# Patient Record
Sex: FEMALE | Race: WHITE | NOT HISPANIC OR LATINO | Employment: OTHER | URBAN - METROPOLITAN AREA
[De-identification: names, ages, dates, MRNs, and addresses within clinical notes are randomized per-mention and may not be internally consistent; named-entity substitution may affect disease eponyms.]

---

## 2021-05-30 ENCOUNTER — TRANSFERRED RECORDS (OUTPATIENT)
Dept: HEALTH INFORMATION MANAGEMENT | Facility: CLINIC | Age: 81
End: 2021-05-30
Payer: COMMERCIAL

## 2022-01-10 ENCOUNTER — TRANSFERRED RECORDS (OUTPATIENT)
Dept: HEALTH INFORMATION MANAGEMENT | Facility: CLINIC | Age: 82
End: 2022-01-10
Payer: COMMERCIAL

## 2022-01-11 ENCOUNTER — MEDICAL CORRESPONDENCE (OUTPATIENT)
Dept: HEALTH INFORMATION MANAGEMENT | Facility: CLINIC | Age: 82
End: 2022-01-11
Payer: COMMERCIAL

## 2022-02-04 ENCOUNTER — TELEPHONE (OUTPATIENT)
Dept: PULMONOLOGY | Facility: CLINIC | Age: 82
End: 2022-02-04
Payer: COMMERCIAL

## 2022-02-04 NOTE — TELEPHONE ENCOUNTER
Spoke with pt regarding NEW pulm referral faxed over by [facility] for dx of lung infiltrate on CT. Pt had PFT and CT completed this month and informed her next opening was April. Pt was agreeable and appt scheduled; appt is close to 3 month protocol, so testing not scheduled. Message sent for record retrieval. Appt scheduled and informed her that itinerary and appt info will be sent to her home; mailing address verified.

## 2022-02-11 NOTE — TELEPHONE ENCOUNTER
RECORDS RECEIVED FROM: External   DATE RECEIVED: 4.13.22   NOTES STATUS DETAILS   OFFICE NOTE from referring provider N/A    OFFICE NOTE from other specialist Care Everywhere 1.11.22 [facility]   DISCHARGE SUMMARY from hospital N/A    DISCHARGE REPORT from the ER N/A    MEDICATION LIST Care Everywhere    IMAGING  (NEED IMAGES AND REPORTS)     CT SCAN In process 1.11.22 [place]  8.24.21 [place]  6.27.21 [place]  5.30.21 [place]  12.22.20 [facility]  More in [facility]   CHEST XRAY (CXR) N/A    TESTS     PULMONARY FUNCTION TESTING (PFT) Care Everywhere 1.11.22 [place]  10.25.21 [place]  12.22.20 [facility]  6.24.20 [facility]      Action 2.11.22 [facility]   Action Taken Requested images from [place] and [facility]    2.22.22 - received images/reports from [facility]

## 2022-04-12 ASSESSMENT — ENCOUNTER SYMPTOMS
SPUTUM PRODUCTION: 1
SHORTNESS OF BREATH: 1
HEMOPTYSIS: 0
COUGH DISTURBING SLEEP: 0
WHEEZING: 0
DYSPNEA ON EXERTION: 0
SNORES LOUDLY: 0
POSTURAL DYSPNEA: 0
COUGH: 1

## 2022-04-13 ENCOUNTER — PRE VISIT (OUTPATIENT)
Dept: PULMONOLOGY | Facility: CLINIC | Age: 82
End: 2022-04-13
Payer: COMMERCIAL

## 2022-04-13 ENCOUNTER — OFFICE VISIT (OUTPATIENT)
Dept: PULMONOLOGY | Facility: CLINIC | Age: 82
End: 2022-04-13
Attending: INTERNAL MEDICINE
Payer: COMMERCIAL

## 2022-04-13 VITALS
RESPIRATION RATE: 16 BRPM | WEIGHT: 130 LBS | HEART RATE: 80 BPM | BODY MASS INDEX: 19.7 KG/M2 | DIASTOLIC BLOOD PRESSURE: 77 MMHG | HEIGHT: 68 IN | SYSTOLIC BLOOD PRESSURE: 160 MMHG | OXYGEN SATURATION: 96 %

## 2022-04-13 DIAGNOSIS — J47.1 BRONCHIECTASIS WITH ACUTE EXACERBATION (H): Primary | ICD-10-CM

## 2022-04-13 PROCEDURE — 99205 OFFICE O/P NEW HI 60 MIN: CPT | Performed by: INTERNAL MEDICINE

## 2022-04-13 PROCEDURE — G0463 HOSPITAL OUTPT CLINIC VISIT: HCPCS

## 2022-04-13 PROCEDURE — 99417 PROLNG OP E/M EACH 15 MIN: CPT | Performed by: INTERNAL MEDICINE

## 2022-04-13 RX ORDER — FLECAINIDE ACETATE 50 MG/1
50 TABLET ORAL
COMMUNITY
Start: 2022-04-05

## 2022-04-13 RX ORDER — CETIRIZINE HYDROCHLORIDE 10 MG/1
10 TABLET ORAL
COMMUNITY

## 2022-04-13 RX ORDER — FLUTICASONE PROPIONATE 50 MCG
2 SPRAY, SUSPENSION (ML) NASAL
COMMUNITY
Start: 2020-06-25 | End: 2022-06-12

## 2022-04-13 RX ORDER — MONTELUKAST SODIUM 10 MG/1
1 TABLET ORAL AT BEDTIME
COMMUNITY
Start: 2020-06-25 | End: 2023-11-08

## 2022-04-13 RX ORDER — ALBUTEROL SULFATE 90 UG/1
2 AEROSOL, METERED RESPIRATORY (INHALATION) EVERY 4 HOURS PRN
Qty: 18 G | Refills: 4 | Status: SHIPPED | OUTPATIENT
Start: 2022-04-13 | End: 2023-11-08

## 2022-04-13 RX ORDER — AMLODIPINE BESYLATE 5 MG/1
5 TABLET ORAL
COMMUNITY
Start: 2020-05-26 | End: 2023-03-14

## 2022-04-13 RX ORDER — AZELASTINE 1 MG/ML
2 SPRAY, METERED NASAL
COMMUNITY
Start: 2021-12-01

## 2022-04-13 RX ORDER — ALBUTEROL SULFATE 90 UG/1
2 AEROSOL, METERED RESPIRATORY (INHALATION) EVERY 4 HOURS PRN
Qty: 18 G | Refills: 0 | Status: SHIPPED | OUTPATIENT
Start: 2022-04-13 | End: 2023-11-08

## 2022-04-13 ASSESSMENT — ENCOUNTER SYMPTOMS
HEMOPTYSIS: 0
SPUTUM PRODUCTION: 1
SHORTNESS OF BREATH: 1
COUGH: 1
WHEEZING: 0

## 2022-04-13 ASSESSMENT — PAIN SCALES - GENERAL: PAINLEVEL: NO PAIN (0)

## 2022-04-13 NOTE — LETTER
Date:April 14, 2022      Patient was self referred, no letter generated. Do not send.        [facility] Information

## 2022-04-13 NOTE — LETTER
"    4/13/2022         RE: Michelle Crowder  [address]  [place] [address]        Dear Colleague,    Thank you for referring your patient, Michelle Crowder, to the [facility] [place]. Please see a copy of my visit note below.              Pulmonary Clinic  New Patient Evaluation    Name: Michelle Crowder MRN: [id number]     Age: 81 year old   YOB: 1940             HPI:   CC: Bronchiectasis    Michelle Crowder is a 81 year old female with H/O atrial fibrillation on apixaban, osteoporosis, history of breast cancer status post mastectomy, hypertension, and bronchiectasis who presents for second opinion for management of her bronchiectasis.    She reports a history of frequent infections and \"bronchitis\" throughout her life beginning in childhood but no true pneumonia until May 30 of 2021.  The oldest CT chest I can find is from 2010 with a report of fibronodular changes in the apices and bronchiectasis.  However, other than her frequent bronchitis she had no respiratory symptoms until after her pneumonia in May.  Following that episode she feels that she has had increased coughing, dyspnea, fatigue, and malaise.  She does feel that her exercise tolerance was pretty good through the summer 2021 and notes that she moved from [facility] to [facility] in October 2021 and she completely cleaned out the house she was moving out of, packed all of her stuff, drove it to the new place, and unpacked into her new home with only mild intermittent dyspnea.  Currently she has trouble walking more than a few blocks on flat ground, or walking at a fast pace.  She thinks she could walk 2-3 flights of stairs in the normal walking pace before she would have to stop and catch her breath.    She is followed with a pulmonologist first in the [facility] system in [facility], then with the [facility] system in [place].  She has been extensively " evaluated including swallow evaluation with no evidence of aspiration.      She has had multiple bronchoscopies:  A.  11/1/2013 in the [facility] system with cytology brush and transbronchial biopsy, though I do not have the procedure report or results of this.  Per current ID note she was found to have actinomyces on BAL and was evaluated by ID but no additional details.    B.  1/27/2021 in the [place] system.  BAL with brushings.  All test negative (see data for more details), though of note I do not see bacterial culture performed on that sample.    C.  9/17/2021 in the [place] system.  BAL with 2 strains of Pseudomonas and pan Aspergillus PCR positive without specific speciation, however Aspergillus antigen PCR negative.  All other tests negative.  Per pulmonology notes the BAL fungal culture grew Exophiala Jeelifelmi, however I am not able to find these actual culture results in Care Everywhere.        Following the most recent bronchoscopy (9/17/2021) she was evaluated by infectious disease (10/4/2021).  The aspergillus was thought to be colonizer and no therapy was recommended.  Was unclear whether or not the Pseudomonas was a colonizer but a trial of eradication therapy was performed.  Due to the patient having allergies against quinolones, a PICC was placed and the patient was treated with cefepime every 8 hours x14 days (October 25 to November 1).  To the end of this therapy she felt that her dyspnea, cough, low appetite, and fatigue were much improved, however her symptoms returned approximately 1 month later.  She feels that her breathing is slowly been worsening since that time.    She was prescribed an Acapella flutter valve over a year ago, but was inconsistent in using it until earlier this year.  She reports that beginning around January she has been using her Acapella consistently twice a day, approximately 10 breaths each time.    She was given a trial of Breo which she used for a few months without  "improvement in her symptoms so this was stopped.    She uses her CPAP every night.      ALLERGIES:  Headstrong adverse reactions to both levofloxacin and ciprofloxacin consisting of dysphoria, dizziness, \"brain fog\" and shakiness.  She has hypertension when taking prednisone.    She had several questions regarding the progression of her pulmonary disease.  I educated her on the possible etiologies of bronchiectasis, including our assumption that it is due to previous infections and explained that bronchiectasis cannot be cured, but if we can keep infections under control it should not progress.      Exposure History:   Tobacco: Never, though father and  both smoked in the house. 30 yrs since last exposure.  Other inhaled substances (Vaping, hookah. Marijuana): None   Occupation:  in [employer] firm  Pets: Cats entire life  Allergies: Seasonal to \"everything\". Testing many years ago. Zyrtec, singulair, mucinex   Hobbies: Reading, puzzles, sew  Travel: No international, [place] in 2020    Home:   Moved to [place] in October 2020.  Lives in a duplex, no carpet. 55+ housing. Forced air, AC. No hot tub. Doesn't use home humidifier. Air filter in BR when smoke was bad.    Previously lived in [facility] in a house, Split level. Carpeted.  Radiant heat. No hot tub use.               Past Medical History:   atrial fibrillation on apixaban, osteoporosis, history of breast cancer status post mastectomy, hypertension, and bronchiectasis          Past Surgical History:   Mastectomy         Social History:     Social History     Socioeconomic History     Marital status:      Spouse name: Not on file     Number of children: Not on file     Years of education: Not on file     Highest education level: Not on file   Occupational History     Not on file   Tobacco Use     Smoking status: Never Smoker     Smokeless tobacco: Never Used   Substance and Sexual Activity     Alcohol use: Not Currently     Drug " use: Never     Sexual activity: Not on file   Other Topics Concern     Not on file   Social History Narrative     Not on file     Social Determinants of Health     Financial Resource Strain: Not on file   Food Insecurity: Not on file   Transportation Needs: Not on file   Physical Activity: Not on file   Stress: Not on file   Social Connections: Not on file   Intimate Partner Violence: Not on file   Housing Stability: Not on file            Family History:   History reviewed. No pertinent family history.          Immunizations:   See assessment and plan         Allergies:     Allergies   Allergen Reactions     Codeine Difficulty breathing, Palpitations, Shortness Of Breath and Other (See Comments)     Racing heart  Racing heart       Levofloxacin Difficulty breathing, Dizziness and Shortness Of Breath     Ciprofloxacin Other (See Comments)     Dizzyness     Prednisone Other (See Comments)     Pt does not wish to take again in the future. Made her feel unwell and caused her BP to go up       Seasonal Allergies Cough     Eyes water             Medications:   amLODIPine (NORVASC) 5 MG tablet, Take 5 mg by mouth  apixaban ANTICOAGULANT (ELIQUIS) 5 MG tablet, Take 5 mg by mouth  azelastine (ASTELIN) 0.1 % nasal spray, Spray 2 sprays in nostril  cetirizine (ZYRTEC) 10 MG tablet, Take 10 mg by mouth  flecainide (TAMBOCOR) 50 MG tablet, Take 50 mg by mouth  fluticasone (FLONASE) 50 MCG/ACT nasal spray, Spray 2 sprays in nostril  MAGNESIUM CITRATE PO, 500 mg  MAGNESIUM CITRATE PO, Take 135 mg by mouth  montelukast (SINGULAIR) 10 MG tablet, Take 1 tablet by mouth At Bedtime  Multiple Vitamins-Minerals (PRESERVISION AREDS 2 PO),   UNABLE TO FIND, [place]    No current facility-administered medications on file prior to visit.           Review of Systems:     Review of Systems   Respiratory: Positive for cough, sputum production and shortness of breath. Negative for hemoptysis and wheezing.    All other systems reviewed and are  "negative.             Exam:   BP (!) 160/77   Pulse 80   Resp 16   Ht 1.727 m (5' 8\")   Wt 59 kg (130 lb)   SpO2 96%   BMI 19.77 kg/m      Physical Exam  Vitals and nursing note reviewed.   Constitutional:       Appearance: Normal appearance. She is normal weight.   Eyes:      Extraocular Movements: Extraocular movements intact.      Conjunctiva/sclera: Conjunctivae normal.      Pupils: Pupils are equal, round, and reactive to light.   Cardiovascular:      Rate and Rhythm: Normal rate and regular rhythm.      Heart sounds: No murmur heard.  Pulmonary:      Effort: Pulmonary effort is normal.      Breath sounds: Normal breath sounds. No wheezing, rhonchi or rales.   Abdominal:      General: Bowel sounds are normal.      Palpations: Abdomen is soft.   Musculoskeletal:      Right lower leg: No edema.      Left lower leg: No edema.   Skin:     General: Skin is warm and dry.   Neurological:      General: No focal deficit present.      Mental Status: She is alert and oriented to person, place, and time.       Fingernails without clubbing         Data:     PFT: 1/11/2022 ([facility], not personally reviewed)  Flow-volume loops not available, but outside read:  Available data suggests moderate obstructive defect.  Absence of bronchodilator response.  Normal lung volumes.  Mild reduction in DLCO.  Increased airway resistance.         Bronchoscopy 9/17/2021  [facility].  BAL with 2 strains of Pseudomonas and pan Aspergillus PCR positive without specific speciation, however Aspergillus antigen PCR negative.  All other tests negative.  Per pulmonology notes the BAL fungal culture grew Exophiala Jeanselmi, however I am not able to find these actual culture results in Care Everywhere.      1. Diffuse bilateral bronchiectasis.   2. There were no significant secretions or endobronchial lesions.   3. There were small thick mucous plugs right lower lobe and left lower lobe.   4. BAL was done in the right middle lobe and " "Microbrush in right lower lobe. Lung wash was also sent.     Bacterial culture with 2 strains of Pseudomonas  Fungal culture with Aspergillus flavus.  Aspergillus PCR positive for \"pan Aspergillus\" but negative for A. fumigatus and A. terreus    Mucorales PCR negative  Aspergillus antigen negative  Viral respiratory panel negative  Nocardia PCR negative  MTB complex PCR negative  Legionella PCR negative  Histo/blasto PCR negative  Differential with 75% neutrophils, 9% lymphocytes, 9% macrophages  P BON negative  C pneumonia and M pneumonia negative  fungitell 101    # Cytology  A. BAL, RIGHT MIDDLE LOBE, BRONCHIAL:  - Negative for malignancy.  - Benign bronchial epithelial cells, reactive pneumocytes, and acute inflammation.         Bronchoscopy 1/27/2021  [facility].  BAL with brushings.  All test negative (see data for more details), though of note I do not see bacterial culture performed on that sample.    The trachea is of normal caliber. The yazmin is        sharp. The entire tracheobronchial tree was examined to at least the        first subsegmental level. Bronchial mucosa and anatomy are normal; there        are no endobronchial lesions and no secretions, except in the right        middle lobe a mucosal irregularity noted..        Brushings of a lesion were obtained with a cytology brush from right        middle lobe endobronchial mucsal lesion..        Bronchoalveolar lavage was performed in the RLL posterior basal segment        (B10) of the lung and sent for cell count, bacterial culture, viral        smears & culture, fungal & AFB analysis and cytology for        immunocompromised host protocol. 80 mL of fluid were instilled. 40 mL        were returned. The return was blood-tinged.   Impression:           - Infiltrate                         - Brushings were obtained.                         - Bronchoalveolar lavage was performed.      Cell count with 86% neutrophils  Mucorales PCR negative  CD4 CD8 " ratio 0.67  Histoplasma antigen negative  C pneumonia and M pneumonia negative  Viral respiratory panel negative  MTB PCR negative  Legionella PCR negative  Histo and blasto PCR negative  CMV negative  Aspergillus PCR negative    # Cytology  A. BAL, RIGHT LOWER LOBE, BRONCHIAL:  - Satisfactory for evaluation.  - Negative for malignancy.   - Benign bronchial epithelial cells, reactive pneumocytes, and acute inflammation.      B. BRONCHIAL BRUSHINGS, RIGHT MIDDLE LOBE, BRONCHIAL:  - Satisfactory for evaluation.  - Negative for malignancy.   - Benign bronchial epithelial cells.     Bronchoscopy 11/1/2013  [facility] with cytology brush and transbronchial biopsy, though I do not have the procedure report or results of this.  Per current ID note she was found to have actinomyces on BAL and was evaluated by ID but no additional details.    Autoimmune evaluation 9/1/2021  WI-3 undetectable  MPO undetectable  ANCA negative  SSA/Ro, SSB/La negative  SCL 70 B negative  EDUARD negative  CCP negative  Rheumatoid factor negative    IgA 354 (within normal limits)  IgG 1287 (within normal limits)  IgM 139  (within normal limits)    3 AFBs in July 2020 all negative          CT chest 1/10/2022  1. Some of the previously seen regions of consolidation in the lungs have resolved, however, other small regions are new compared to the prior exam. Additionally, there are new regions of tree-in-bud nodularity in the medial left upper lobe and the right middle lobe and increased tree-in-bud nodularity in the right lower lobe suggesting nonspecific infection. Similar appearance of bronchiectasis most notable in the right middle lobe that may be seen with Mycobacterium avium infection. Recommend continued follow-up with CT.   2. No suspicious pulmonary nodule, however, evaluation is limited by new regions of consolidation.       CT chest 8/23/2021  1. New areas of consolidation with surrounding groundglass opacities and micronodularity in the  right perihilar and upper lobe is concerning for an infectious/inflammatory process that has developed since 6/27/2021. Follow-up chest CT in 8-12 weeks is recommended.   2. Again seen are the changes of bronchiectasis with micronodularity and tree-in-bud opacities and patchy areas of mucous plugging and consolidation predominantly in the right middle lobe and lingula which raises the possibility of chronic mycobacterium avium infection.   3. Scattered noncalcified pulmonary nodules are stable.       CT chest 5/30/2021  1. No pulmonary emboli.   2. New areas of consolidation predominantly in the lower lungs and lingula is concerning for an infectious/inflammatory process.   3. Again seen are the changes of bronchiectasis with micronodularity and tree-in-bud opacities in both lungs predominantly in the right middle lobe and lingula suggesting a chronic inflammatory process such as mycobacterium avium infection.   4. A 5 mm noncalcified nodule in the left upper lobe has been stable since 7/2/2020.   5. Prominent right hilar lymph nodes are likely reactive.     CT chest 12/22/2020  1.  Grossly stable bilateral peripheral bronchiectasis with mucus plugging, consolidative opacities in the RIGHT lung base, and scattered nodular infiltrates representing a chronic infectious process.   2.  Additional findings as above.       CT chest 7/2/2020  1.  Constellation of findings including peripheral right middle lobe, lingular,   and posterior right lower lobe bronchiectasis with peripheral airway material   and scarring and patchy diffuse mid and lower lung zone predominant foci of   airway material with associated groundglass opacities. Overall the pattern is   most suggestive of an indolent airways inflammatory process. Nontuberculous   mycobacterial infection (ROQUE) and can produce this pattern. Chronic aspiration   is an additional consideration.      CT chest 4/10/2019 (images not available)  CHEST: There is been no  significant change in mild scattered tiny nodules and   groundglass opacities present within each lung. Mild bronchiectasis persist   within portions of right middle lobe and lingula. Stable 3 mm nodule inferior   right middle lobe. Findings may relate to old inflammatory/infectious process   such prior MAC infection but no new infiltrates or nodules.   No lymphadenopathy. Heart size normal.     1.  No etiology for symptoms identified. Benign-appearing hepatic cysts are   unchanged. No suspicious abdominal or pelvic lesion.   2.  Stable appearance of lung parenchyma with apparent sequela of remote   infection but no new process evident      CT chest 6/13/2018  1.  Apical pleural scarring unchanged.   2.  Scattered areas of bronchiectasis and chronic infiltrates and nodules within both lung fields basically unchanged.   3.  Status post LEFT mastectomy.   4.  Minimal aortic ASVD   5.  No gross mediastinal adenopathy.   6.  Hepatic cysts noted.   7.  Degenerative changes noted within the dorsal spine.       CT chest 12/15/2017  FINDINGS:  No evidence for axillary adenopathy. Previous LEFT mastectomy. Surgical clips noted in the LEFT axilla. Mediastinal lymph nodes are similar to prior study and not enlarged by CT criteria. Tonya are not adequately assessed without intravenous contrast. Aortic calcification noted. Minimal coronary calcification. Hepatic hypodensities appear similar. Adrenal glands appear within normal limits as visualized. Evaluation of the lung parenchyma demonstrates scattered nodules and densities within the lungs bilaterally. These appear similar. There is bilateral bronchiectasis.     1.  Overall no significant interval change.   2.  No axillary or mediastinal adenopathy.   3.  Aortic and coronary calcification.   4.  Bronchiectasis.   5.  Scattered nodules and densities bilaterally appear similar.       CT chest 9/15/2010 (images not available)  1.   Status post left mastectomy.   2.   Fibronodular  densities along the apical regions with some scattered   cystic changes being noted, most likely chronic inflammation and scar.   There are also a few scattered areas of what may be chronic infiltrates   and some cystic bronchiectasis and/or   scarring involving the left lung base. There are 1 or 2 small   indeterminate pulmonary nodules also. For the scattered multiple   indeterminate findings I would recommend a 3-month followup to reassess.   3.   No gross adenopathy as could be visualized.   4.   The heart is not enlarged.   5.   One large cyst involving the left hepatic lobe. There are 1 or 2   other areas which may be cysts but are really too small to characterize   involving the liver. Short-term followup would be recommended.   6.   Probable small hiatal hernia.   7.   Recommendations would be for short-term followup and reassessment in   3 months.         All studies listed here were independently reviewed and interpreted by me today unless otherwise noted for the specific study.          Assessment and Plan:     ## Bronchiectasis  ## Positive Aspergillus culture  ## Positive Pseudomonas culture status post eradication therapy  This patient has a lifelong history of frequent bronchitis and pulmonary infections but had no baseline respiratory complaints until May 30 when she developed a pneumonia, following that she had persistent dyspnea.  Etiology of her bronchiectasis is not entirely clear, but she has no recognized exposures, and a negative swallow evaluation for aspiration, so it is likely the results of recurrent pneumonia/pulmonary infections early in life. 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  She was also found to have 2 strains of Pseudomonas.  It was unclear whether not this was a colonizer, however she had significant symptomatic benefit with eradication therapy (2 weeks of cefepime) and return of symptoms approximately 1 month after completion of therapy.   This change in her symptoms would suggest the Pseudomonas is a true infection rather than merely a colonizer.  She has previously been prescribed some level of clearance therapy with an Acapella flutter valve, however for much of her past she was inconsistent in its use.  She reports consistently using it twice daily starting in January.  She has tried Brio Ellipta inhaler without symptomatic benefit.  Her return of symptoms 1 month after completing Pseudomonas eradication therapy suggest that she again has an active infection.  I will reculture her with an expectorated sputum to see if she has Pseudomonas.  If this is negative she may benefit from bronchoscopy.  If she is found to have Pseudomonas we will likely pursue a second round of eradication therapy with systemic antibiotics combined with inhaled tobramycin.  I would then continue inhaled tobramycin with 2 weeks of therapy alternating with 2 weeks off of therapy for an initial period of 6 months for maintenance of Pseudomonas control.    -Sputum culture  -If sputum culture is unrevealing, will likely need bronchoscopy with BAL  -Start albuterol, use as needed, but specifically prior to use of flutter valve  -Continue using flutter valve (has Acapella, given Aerobika today) 3-4 times per day as able  -If this is insufficient for pulmonary clearance, will consider vest therapy.      -There are plans which will allow a 30-day trial of vest therapy to assess for response.  -She will likely benefit from pulmonary rehab, will discuss at follow-up visit  -Aspergillus is likely a colonizer so we will not focus on this for now.  However, if she continues to be symptomatic after repeat Pseudomonas eradication, she may benefit from additional evaluation for Aspergillus, including Aspergillus specific IgE to assess for ABPA.      ### JEAN  She follows with sleep medicine in the [facility], uses CPAP consistently at night.  -Continue using CPAP  -Consider future  evaluation for pulmonary hypertension if dyspnea persists        ## Health maintenance  She has had 2 Pneumovax shots, the Prevnar, annual flu vaccine, 2 Covid shots, one booster, and is planning to receive her second booster.        Return to clinic: 4 months      I personally spent 205 minutes on the date of the encounter doing extensive review of this patient's outside records, history and exam, documentation and further activities per the note.      Nicolás Cunningham M.D.  Pulmonary & Critical Care  Pager: Click Here to page      The above note was dictated using voice recognition software and may include typographical errors. Please contact the author for any clarifications.        Again, thank you for allowing me to participate in the care of your patient.        Sincerely,        Nicolás Cunningham MD

## 2022-04-13 NOTE — PROGRESS NOTES
"          Pulmonary Clinic  New Patient Evaluation    Name: Michelle Crowder MRN: [id number]     Age: 81 year old   YOB: 1940             HPI:   CC: Bronchiectasis    Michelle Crowder is a 81 year old female with H/O atrial fibrillation on apixaban, osteoporosis, history of breast cancer status post mastectomy, hypertension, and bronchiectasis who presents for second opinion for management of her bronchiectasis.    She reports a history of frequent infections and \"bronchitis\" throughout her life beginning in childhood but no true pneumonia until May 30 of 2021.  The oldest CT chest I can find is from 2010 with a report of fibronodular changes in the apices and bronchiectasis.  However, other than her frequent bronchitis she had no respiratory symptoms until after her pneumonia in May.  Following that episode she feels that she has had increased coughing, dyspnea, fatigue, and malaise.  She does feel that her exercise tolerance was pretty good through the summer 2021 and notes that she moved from [facility] to [facility] in October 2021 and she completely cleaned out the house she was moving out of, packed all of her stuff, drove it to the new place, and unpacked into her new home with only mild intermittent dyspnea.  Currently she has trouble walking more than a few blocks on flat ground, or walking at a fast pace.  She thinks she could walk 2-3 flights of stairs in the normal walking pace before she would have to stop and catch her breath.    She is followed with a pulmonologist first in the [facility] in [facility], then with the [facility] in [place].  She has been extensively evaluated including swallow evaluation with no evidence of aspiration.      She has had multiple bronchoscopies:  A.  11/1/2013 in the [facility] with cytology brush and transbronchial biopsy, though I do not have the procedure report or results of this.  Per current ID note she was found " "to have actinomyces on BAL and was evaluated by ID but no additional details.    B.  1/27/2021 in the [facility] system.  BAL with brushings.  All test negative (see data for more details), though of note I do not see bacterial culture performed on that sample.    C.  9/17/2021 in the [facility] system.  BAL with 2 strains of Pseudomonas and pan Aspergillus PCR positive without specific speciation, however Aspergillus antigen PCR negative.  All other tests negative.  Per pulmonology notes the BAL fungal culture grew Exophiala Jeanselmi, however I am not able to find these actual culture results in Care Everywhere.        Following the most recent bronchoscopy (9/17/2021) she was evaluated by infectious disease (10/4/2021).  The aspergillus was thought to be colonizer and no therapy was recommended.  Was unclear whether or not the Pseudomonas was a colonizer but a trial of eradication therapy was performed.  Due to the patient having allergies against quinolones, a PICC was placed and the patient was treated with cefepime every 8 hours x14 days (October 25 to November 1).  To the end of this therapy she felt that her dyspnea, cough, low appetite, and fatigue were much improved, however her symptoms returned approximately 1 month later.  She feels that her breathing is slowly been worsening since that time.    She was prescribed an Acapella flutter valve over a year ago, but was inconsistent in using it until earlier this year.  She reports that beginning around January she has been using her Acapella consistently twice a day, approximately 10 breaths each time.    She was given a trial of Breo which she used for a few months without improvement in her symptoms so this was stopped.    She uses her CPAP every night.      ALLERGIES:  Headstrong adverse reactions to both levofloxacin and ciprofloxacin consisting of dysphoria, dizziness, \"brain fog\" and shakiness.  She has hypertension when taking prednisone.    She had several " "questions regarding the progression of her pulmonary disease.  I educated her on the possible etiologies of bronchiectasis, including our assumption that it is due to previous infections and explained that bronchiectasis cannot be cured, but if we can keep infections under control it should not progress.      Exposure History:   Tobacco: Never, though father and  both smoked in the house. 30 yrs since last exposure.  Other inhaled substances (Vaping, hookah. Marijuana): None   Occupation:  in accounting firm  Pets: Cats entire life  Allergies: Seasonal to \"everything\". Testing many years ago. Zyrtec, singulair, mucinex   Hobbies: Reading, puzzles, sew  Travel: No international, [place] in 2020    Home:   Moved to [place] in October 2020.  Lives in a duplex, no carpet. 55+ housing. Forced air, AC. No hot tub. Doesn't use home humidifier. Air filter in BR when smoke was bad.    Previously lived in [facility] in a house, Split level. Carpeted.  Radiant heat. No hot tub use.               Past Medical History:   atrial fibrillation on apixaban, osteoporosis, history of breast cancer status post mastectomy, hypertension, and bronchiectasis          Past Surgical History:   Mastectomy         Social History:     Social History     Socioeconomic History     Marital status:      Spouse name: Not on file     Number of children: Not on file     Years of education: Not on file     Highest education level: Not on file   Occupational History     Not on file   Tobacco Use     Smoking status: Never Smoker     Smokeless tobacco: Never Used   Substance and Sexual Activity     Alcohol use: Not Currently     Drug use: Never     Sexual activity: Not on file   Other Topics Concern     Not on file   Social History Narrative     Not on file     Social Determinants of Health     Financial Resource Strain: Not on file   Food Insecurity: Not on file   Transportation Needs: Not on file   Physical Activity: Not " "on file   Stress: Not on file   Social Connections: Not on file   Intimate Partner Violence: Not on file   Housing Stability: Not on file            Family History:   History reviewed. No pertinent family history.          Immunizations:   See assessment and plan         Allergies:     Allergies   Allergen Reactions     Codeine Difficulty breathing, Palpitations, Shortness Of Breath and Other (See Comments)     Racing heart  Racing heart       Levofloxacin Difficulty breathing, Dizziness and Shortness Of Breath     Ciprofloxacin Other (See Comments)     Dizzyness     Prednisone Other (See Comments)     Pt does not wish to take again in the future. Made her feel unwell and caused her BP to go up       Seasonal Allergies Cough     Eyes water             Medications:   amLODIPine (NORVASC) 5 MG tablet, Take 5 mg by mouth  apixaban ANTICOAGULANT (ELIQUIS) 5 MG tablet, Take 5 mg by mouth  azelastine (ASTELIN) 0.1 % nasal spray, Spray 2 sprays in nostril  cetirizine (ZYRTEC) 10 MG tablet, Take 10 mg by mouth  flecainide (TAMBOCOR) 50 MG tablet, Take 50 mg by mouth  fluticasone (FLONASE) 50 MCG/ACT nasal spray, Spray 2 sprays in nostril  MAGNESIUM CITRATE PO, 500 mg  MAGNESIUM CITRATE PO, Take 135 mg by mouth  montelukast (SINGULAIR) 10 MG tablet, Take 1 tablet by mouth At Bedtime  Multiple Vitamins-Minerals (PRESERVISION AREDS 2 PO),   UNABLE TO FIND, [place]    No current facility-administered medications on file prior to visit.           Review of Systems:     Review of Systems   Respiratory: Positive for cough, sputum production and shortness of breath. Negative for hemoptysis and wheezing.    All other systems reviewed and are negative.             Exam:   BP (!) 160/77   Pulse 80   Resp 16   Ht 1.727 m (5' 8\")   Wt 59 kg (130 lb)   SpO2 96%   BMI 19.77 kg/m      Physical Exam  Vitals and nursing note reviewed.   Constitutional:       Appearance: Normal appearance. She is normal weight.   Eyes:      Extraocular " "Movements: Extraocular movements intact.      Conjunctiva/sclera: Conjunctivae normal.      Pupils: Pupils are equal, round, and reactive to light.   Cardiovascular:      Rate and Rhythm: Normal rate and regular rhythm.      Heart sounds: No murmur heard.  Pulmonary:      Effort: Pulmonary effort is normal.      Breath sounds: Normal breath sounds. No wheezing, rhonchi or rales.   Abdominal:      General: Bowel sounds are normal.      Palpations: Abdomen is soft.   Musculoskeletal:      Right lower leg: No edema.      Left lower leg: No edema.   Skin:     General: Skin is warm and dry.   Neurological:      General: No focal deficit present.      Mental Status: She is alert and oriented to person, place, and time.       Fingernails without clubbing         Data:     PFT: 1/11/2022 ([facility], not personally reviewed)  Flow-volume loops not available, but outside read:  Available data suggests moderate obstructive defect.  Absence of bronchodilator response.  Normal lung volumes.  Mild reduction in DLCO.  Increased airway resistance.         Bronchoscopy 9/17/2021  [facility].  BAL with 2 strains of Pseudomonas and pan Aspergillus PCR positive without specific speciation, however Aspergillus antigen PCR negative.  All other tests negative.  Per pulmonology notes the BAL fungal culture grew Exophiala Jeanselmi, however I am not able to find these actual culture results in Care Everywhere.      1. Diffuse bilateral bronchiectasis.   2. There were no significant secretions or endobronchial lesions.   3. There were small thick mucous plugs right lower lobe and left lower lobe.   4. BAL was done in the right middle lobe and Microbrush in right lower lobe. Lung wash was also sent.     Bacterial culture with 2 strains of Pseudomonas  Fungal culture with Aspergillus flavus.  Aspergillus PCR positive for \"pan Aspergillus\" but negative for A. fumigatus and A. terreus    Mucorales PCR negative  Aspergillus antigen " negative  Viral respiratory panel negative  Nocardia PCR negative  MTB complex PCR negative  Legionella PCR negative  Histo/blasto PCR negative  Differential with 75% neutrophils, 9% lymphocytes, 9% macrophages  P BON negative  C pneumonia and M pneumonia negative  fungitell 101    # Cytology  A. BAL, RIGHT MIDDLE LOBE, BRONCHIAL:  - Negative for malignancy.  - Benign bronchial epithelial cells, reactive pneumocytes, and acute inflammation.         Bronchoscopy 1/27/2021  [facility].  BAL with brushings.  All test negative (see data for more details), though of note I do not see bacterial culture performed on that sample.    The trachea is of normal caliber. The yazmin is        sharp. The entire tracheobronchial tree was examined to at least the        first subsegmental level. Bronchial mucosa and anatomy are normal; there        are no endobronchial lesions and no secretions, except in the right        middle lobe a mucosal irregularity noted..        Brushings of a lesion were obtained with a cytology brush from right        middle lobe endobronchial mucsal lesion..        Bronchoalveolar lavage was performed in the RLL posterior basal segment        (B10) of the lung and sent for cell count, bacterial culture, viral        smears & culture, fungal & AFB analysis and cytology for        immunocompromised host protocol. 80 mL of fluid were instilled. 40 mL        were returned. The return was blood-tinged.   Impression:           - Infiltrate                         - Brushings were obtained.                         - Bronchoalveolar lavage was performed.      Cell count with 86% neutrophils  Mucorales PCR negative  CD4 CD8 ratio 0.67  Histoplasma antigen negative  C pneumonia and M pneumonia negative  Viral respiratory panel negative  MTB PCR negative  Legionella PCR negative  Histo and blasto PCR negative  CMV negative  Aspergillus PCR negative    # Cytology  A. BAL, RIGHT LOWER LOBE, BRONCHIAL:  -  Satisfactory for evaluation.  - Negative for malignancy.   - Benign bronchial epithelial cells, reactive pneumocytes, and acute inflammation.      B. BRONCHIAL BRUSHINGS, RIGHT MIDDLE LOBE, BRONCHIAL:  - Satisfactory for evaluation.  - Negative for malignancy.   - Benign bronchial epithelial cells.     Bronchoscopy 11/1/2013  [facility] with cytology brush and transbronchial biopsy, though I do not have the procedure report or results of this.  Per current ID note she was found to have actinomyces on BAL and was evaluated by ID but no additional details.    Autoimmune evaluation 9/1/2021  WI-3 undetectable  MPO undetectable  ANCA negative  SSA/Ro, SSB/La negative  SCL 70 B negative  EDUARD negative  CCP negative  Rheumatoid factor negative    IgA 354 (within normal limits)  IgG 1287 (within normal limits)  IgM 139  (within normal limits)    3 AFBs in July 2020 all negative          CT chest 1/10/2022  1. Some of the previously seen regions of consolidation in the lungs have resolved, however, other small regions are new compared to the prior exam. Additionally, there are new regions of tree-in-bud nodularity in the medial left upper lobe and the right middle lobe and increased tree-in-bud nodularity in the right lower lobe suggesting nonspecific infection. Similar appearance of bronchiectasis most notable in the right middle lobe that may be seen with Mycobacterium avium infection. Recommend continued follow-up with CT.   2. No suspicious pulmonary nodule, however, evaluation is limited by new regions of consolidation.       CT chest 8/23/2021  1. New areas of consolidation with surrounding groundglass opacities and micronodularity in the right perihilar and upper lobe is concerning for an infectious/inflammatory process that has developed since 6/27/2021. Follow-up chest CT in 8-12 weeks is recommended.   2. Again seen are the changes of bronchiectasis with micronodularity and tree-in-bud opacities and patchy areas  of mucous plugging and consolidation predominantly in the right middle lobe and lingula which raises the possibility of chronic mycobacterium avium infection.   3. Scattered noncalcified pulmonary nodules are stable.       CT chest 5/30/2021  1. No pulmonary emboli.   2. New areas of consolidation predominantly in the lower lungs and lingula is concerning for an infectious/inflammatory process.   3. Again seen are the changes of bronchiectasis with micronodularity and tree-in-bud opacities in both lungs predominantly in the right middle lobe and lingula suggesting a chronic inflammatory process such as mycobacterium avium infection.   4. A 5 mm noncalcified nodule in the left upper lobe has been stable since 7/2/2020.   5. Prominent right hilar lymph nodes are likely reactive.     CT chest 12/22/2020  1.  Grossly stable bilateral peripheral bronchiectasis with mucus plugging, consolidative opacities in the RIGHT lung base, and scattered nodular infiltrates representing a chronic infectious process.   2.  Additional findings as above.       CT chest 7/2/2020  1.  Constellation of findings including peripheral right middle lobe, lingular,   and posterior right lower lobe bronchiectasis with peripheral airway material   and scarring and patchy diffuse mid and lower lung zone predominant foci of   airway material with associated groundglass opacities. Overall the pattern is   most suggestive of an indolent airways inflammatory process. Nontuberculous   mycobacterial infection (ROQUE) and can produce this pattern. Chronic aspiration   is an additional consideration.      CT chest 4/10/2019 (images not available)  CHEST: There is been no significant change in mild scattered tiny nodules and   groundglass opacities present within each lung. Mild bronchiectasis persist   within portions of right middle lobe and lingula. Stable 3 mm nodule inferior   right middle lobe. Findings may relate to old inflammatory/infectious process    such prior MAC infection but no new infiltrates or nodules.   No lymphadenopathy. Heart size normal.     1.  No etiology for symptoms identified. Benign-appearing hepatic cysts are   unchanged. No suspicious abdominal or pelvic lesion.   2.  Stable appearance of lung parenchyma with apparent sequela of remote   infection but no new process evident      CT chest 6/13/2018  1.  Apical pleural scarring unchanged.   2.  Scattered areas of bronchiectasis and chronic infiltrates and nodules within both lung fields basically unchanged.   3.  Status post LEFT mastectomy.   4.  Minimal aortic ASVD   5.  No gross mediastinal adenopathy.   6.  Hepatic cysts noted.   7.  Degenerative changes noted within the dorsal spine.       CT chest 12/15/2017  FINDINGS:  No evidence for axillary adenopathy. Previous LEFT mastectomy. Surgical clips noted in the LEFT axilla. Mediastinal lymph nodes are similar to prior study and not enlarged by CT criteria. Tonya are not adequately assessed without intravenous contrast. Aortic calcification noted. Minimal coronary calcification. Hepatic hypodensities appear similar. Adrenal glands appear within normal limits as visualized. Evaluation of the lung parenchyma demonstrates scattered nodules and densities within the lungs bilaterally. These appear similar. There is bilateral bronchiectasis.     1.  Overall no significant interval change.   2.  No axillary or mediastinal adenopathy.   3.  Aortic and coronary calcification.   4.  Bronchiectasis.   5.  Scattered nodules and densities bilaterally appear similar.       CT chest 9/15/2010 (images not available)  1.   Status post left mastectomy.   2.   Fibronodular densities along the apical regions with some scattered   cystic changes being noted, most likely chronic inflammation and scar.   There are also a few scattered areas of what may be chronic infiltrates   and some cystic bronchiectasis and/or   scarring involving the left lung base. There  are 1 or 2 small   indeterminate pulmonary nodules also. For the scattered multiple   indeterminate findings I would recommend a 3-month followup to reassess.   3.   No gross adenopathy as could be visualized.   4.   The heart is not enlarged.   5.   One large cyst involving the left hepatic lobe. There are 1 or 2   other areas which may be cysts but are really too small to characterize   involving the liver. Short-term followup would be recommended.   6.   Probable small hiatal hernia.   7.   Recommendations would be for short-term followup and reassessment in   3 months.         All studies listed here were independently reviewed and interpreted by me today unless otherwise noted for the specific study.          Assessment and Plan:     ## Bronchiectasis  ## Positive Aspergillus culture  ## Positive Pseudomonas culture status post eradication therapy  This patient has a lifelong history of frequent bronchitis and pulmonary infections but had no baseline respiratory complaints until May 30 when she developed a pneumonia, following that she had persistent dyspnea.  Etiology of her bronchiectasis is not entirely clear, but she has no recognized exposures, and a negative swallow evaluation for aspiration, so it is likely the results of recurrent pneumonia/pulmonary infections early in life. 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  She was also found to have 2 strains of Pseudomonas.  It was unclear whether not this was a colonizer, however she had significant symptomatic benefit with eradication therapy (2 weeks of cefepime) and return of symptoms approximately 1 month after completion of therapy.  This change in her symptoms would suggest the Pseudomonas is a true infection rather than merely a colonizer.  She has previously been prescribed some level of clearance therapy with an Acapella flutter valve, however for much of her past she was inconsistent in its use.  She reports  consistently using it twice daily starting in January.  She has tried Brio Ellipta inhaler without symptomatic benefit.  Her return of symptoms 1 month after completing Pseudomonas eradication therapy suggest that she again has an active infection.  I will reculture her with an expectorated sputum to see if she has Pseudomonas.  If this is negative she may benefit from bronchoscopy.  If she is found to have Pseudomonas we will likely pursue a second round of eradication therapy with systemic antibiotics combined with inhaled tobramycin.  I would then continue inhaled tobramycin with 2 weeks of therapy alternating with 2 weeks off of therapy for an initial period of 6 months for maintenance of Pseudomonas control.    -Sputum culture  -If sputum culture is unrevealing, will likely need bronchoscopy with BAL  -Start albuterol, use as needed, but specifically prior to use of flutter valve  -Continue using flutter valve (has Acapella, given Aerobika today) 3-4 times per day as able  -If this is insufficient for pulmonary clearance, will consider vest therapy.      -There are plans which will allow a 30-day trial of vest therapy to assess for response.  -She will likely benefit from pulmonary rehab, will discuss at follow-up visit  -Aspergillus is likely a colonizer so we will not focus on this for now.  However, if she continues to be symptomatic after repeat Pseudomonas eradication, she may benefit from additional evaluation for Aspergillus, including Aspergillus specific IgE to assess for ABPA.      ### JEAN  She follows with sleep medicine in the [facility], uses CPAP consistently at night.  -Continue using CPAP  -Consider future evaluation for pulmonary hypertension if dyspnea persists        ## Health maintenance  She has had 2 Pneumovax shots, the Prevnar, annual flu vaccine, 2 Covid shots, one booster, and is planning to receive her second booster.        Return to clinic: 4 months      I personally spent 205  minutes on the date of the encounter doing extensive review of this patient's outside records, history and exam, documentation and further activities per the note.      Nicolás Cunningham M.D.  Pulmonary & Critical Care  Pager: Click Here to page      The above note was dictated using voice recognition software and may include typographical errors. Please contact the author for any clarifications.

## 2022-04-13 NOTE — NURSING NOTE
Chief Complaint   Patient presents with     Consult     New CT infiltrate    Medications reviewed and vital signs taken.   Tonio Luis CMA

## 2022-04-13 NOTE — PATIENT INSTRUCTIONS
Start using albuterol about 10-15 minutes before using the flutter valve.  Use flutter valve as many times per day as you can, try to target 3-4.

## 2022-04-15 ENCOUNTER — LAB (OUTPATIENT)
Dept: LAB | Facility: CLINIC | Age: 82
End: 2022-04-15
Payer: COMMERCIAL

## 2022-04-15 DIAGNOSIS — J47.1 BRONCHIECTASIS WITH ACUTE EXACERBATION (H): ICD-10-CM

## 2022-04-15 LAB
BACTERIA SPT CULT: NORMAL
GRAM STAIN RESULT: NORMAL

## 2022-04-15 PROCEDURE — 87205 SMEAR GRAM STAIN: CPT

## 2022-05-26 ENCOUNTER — TELEPHONE (OUTPATIENT)
Dept: PULMONOLOGY | Facility: CLINIC | Age: 82
End: 2022-05-26
Payer: COMMERCIAL

## 2022-05-26 DIAGNOSIS — J47.1 BRONCHIECTASIS WITH ACUTE EXACERBATION (H): Primary | ICD-10-CM

## 2022-05-26 NOTE — TELEPHONE ENCOUNTER
Spoke with pt regarding scheduling CT chest and PFT as instructed by Dr. Cunningham and Aysha RN, prior to visit in Aug. Per chart review, appears appt in Aug (8/17) will also need to be rescheduled as provider is unavailable. Appts rescheduled and details confirmed with pt who requested itinerary be mailed to her home; mailing address verified.

## 2022-05-29 ENCOUNTER — HEALTH MAINTENANCE LETTER (OUTPATIENT)
Age: 82
End: 2022-05-29

## 2022-08-02 ENCOUNTER — LAB (OUTPATIENT)
Dept: LAB | Facility: CLINIC | Age: 82
End: 2022-08-02
Payer: COMMERCIAL

## 2022-08-02 ENCOUNTER — ANCILLARY PROCEDURE (OUTPATIENT)
Dept: CT IMAGING | Facility: CLINIC | Age: 82
End: 2022-08-02
Attending: INTERNAL MEDICINE
Payer: COMMERCIAL

## 2022-08-02 ENCOUNTER — OFFICE VISIT (OUTPATIENT)
Dept: PULMONOLOGY | Facility: CLINIC | Age: 82
End: 2022-08-02
Attending: INTERNAL MEDICINE
Payer: COMMERCIAL

## 2022-08-02 VITALS
SYSTOLIC BLOOD PRESSURE: 146 MMHG | HEIGHT: 68 IN | WEIGHT: 128 LBS | BODY MASS INDEX: 19.4 KG/M2 | OXYGEN SATURATION: 97 % | DIASTOLIC BLOOD PRESSURE: 73 MMHG | HEART RATE: 78 BPM

## 2022-08-02 DIAGNOSIS — J47.9 BRONCHIECTASIS WITHOUT COMPLICATION (H): ICD-10-CM

## 2022-08-02 DIAGNOSIS — J47.1 BRONCHIECTASIS WITH ACUTE EXACERBATION (H): ICD-10-CM

## 2022-08-02 DIAGNOSIS — J47.1 BRONCHIECTASIS WITH ACUTE EXACERBATION (H): Primary | ICD-10-CM

## 2022-08-02 DIAGNOSIS — J47.9 BRONCHIECTASIS (H): Primary | ICD-10-CM

## 2022-08-02 PROCEDURE — G0463 HOSPITAL OUTPT CLINIC VISIT: HCPCS | Mod: 25

## 2022-08-02 PROCEDURE — 99000 SPECIMEN HANDLING OFFICE-LAB: CPT | Performed by: PATHOLOGY

## 2022-08-02 PROCEDURE — 94729 DIFFUSING CAPACITY: CPT | Performed by: INTERNAL MEDICINE

## 2022-08-02 PROCEDURE — 71250 CT THORAX DX C-: CPT | Mod: GC | Performed by: RADIOLOGY

## 2022-08-02 PROCEDURE — 99215 OFFICE O/P EST HI 40 MIN: CPT | Mod: 25 | Performed by: INTERNAL MEDICINE

## 2022-08-02 PROCEDURE — 82785 ASSAY OF IGE: CPT | Performed by: INTERNAL MEDICINE

## 2022-08-02 PROCEDURE — 86003 ALLG SPEC IGE CRUDE XTRC EA: CPT | Mod: GA | Performed by: INTERNAL MEDICINE

## 2022-08-02 PROCEDURE — 36415 COLL VENOUS BLD VENIPUNCTURE: CPT | Performed by: PATHOLOGY

## 2022-08-02 PROCEDURE — 94060 EVALUATION OF WHEEZING: CPT | Performed by: INTERNAL MEDICINE

## 2022-08-02 PROCEDURE — 86317 IMMUNOASSAY INFECTIOUS AGENT: CPT | Mod: 90 | Performed by: PATHOLOGY

## 2022-08-02 PROCEDURE — 94726 PLETHYSMOGRAPHY LUNG VOLUMES: CPT | Performed by: INTERNAL MEDICINE

## 2022-08-02 RX ORDER — SODIUM CHLORIDE FOR INHALATION 7 %
VIAL, NEBULIZER (ML) INHALATION
Qty: 4 ML | Refills: 0 | Status: SHIPPED | OUTPATIENT
Start: 2022-08-02 | End: 2023-11-08

## 2022-08-02 ASSESSMENT — PAIN SCALES - GENERAL: PAINLEVEL: NO PAIN (0)

## 2022-08-02 NOTE — PROGRESS NOTES
Pulmonology Clinic Follow up Visit       Michelle Crowder MRN# [id number]   YOB: 1940 Age: 81 year old     Date of Visit: 8/2/2022    Reason for Visit: Follow-up bronchiectasis          Assessment and Plan:       ## Bronchiectasis  ## Past Aspergillus culture  ## Positive Pseudomonas culture status post eradication therapy  This patient has a lifelong history of frequent bronchitis and pulmonary infections but had no baseline respiratory complaints until May 30 2021 when she developed a pneumonia, following that she had persistent dyspnea.  Etiology of her bronchiectasis is not entirely clear, but she has no recognized exposures, and a negative swallow evaluation for aspiration, has undergone bronchoscopy on 9/17/2021 at [facility] with Aspergillus and Pseudomonas.  Multiple AFBs have been negative.  So it is likely the results of recurrent pneumonia/pulmonary infections early in life. 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  She was also found to have 2 strains of Pseudomonas.  It was unclear whether not this was a colonizer, however she had significant symptomatic benefit with eradication therapy (2 weeks of cefepime) and return of symptoms approximately 1 month after completion of therapy.  This change in her symptoms would suggest the Pseudomonas is a true infection rather than merely a colonizer.  She has previously been prescribed some level of clearance therapy with an Acapella flutter valve, however for much of her past she was inconsistent in its use.  She reports consistently using it twice daily starting in January 2022.  She has tried Brio Ellipta inhaler without symptomatic benefit.  Her return of symptoms 1 month after completing Pseudomonas eradication therapy suggest that she again has an active infection.  We tried to obtain a sputum culture following her last visit but this was contaminated with oral material.  At this point I will pursue an  induced sputum. If this is negative she may benefit from bronchoscopy.  If she is found to have Pseudomonas we will likely pursue a second round of eradication therapy with systemic antibiotics combined with inhaled tobramycin.  I would then continue inhaled tobramycin with 2 weeks of therapy alternating with 2 weeks off of therapy for an initial period of 6 months for maintenance of Pseudomonas control.     -Induced sputum  -If sputum culture is unrevealing, will likely need bronchoscopy with BAL  -Start albuterol, use as needed, but specifically prior to use of flutter valve  -Continue using flutter valve (has Acapella, given Aerobika today) 3-4 times per day as able  -We will arrange a trial of percussion vest  -Pulmonary rehab referral  -Aspergillus is likely a colonizer so we will not focus on this for now.  However, if she continues to be symptomatic after repeat Pseudomonas eradication, she may benefit from additional evaluation for Aspergillus, including Aspergillus specific IgE to assess for ABPA.  -Total and Aspergillus specific IgE     ### JEAN  She follows with sleep medicine in the [facility], uses CPAP consistently at night.  -Continue using CPAP  -Consider future evaluation for pulmonary hypertension if dyspnea persists           ## Health maintenance  She has had 2 Pneumovax shots, the Prevnar, annual flu vaccine, for COVID shots.      Return to clinic: 6 months    I personally spent 49 minutes on the date of the encounter doing chart review, history and exam, documentation and further activities per the note.      Nicolás Cunningham M.D.  Pulmonary & Critical Care  Pager: Click Here to page          History of Present Illness / Interval History:     Michelle Crowder is a 81 year old female with H/O atrial fibrillation on apixaban, osteoporosis, history of breast cancer status post mastectomy, hypertension, and bronchiectasis who presents for second opinion for management of her  "bronchiectasis.      Last seen when she established care on 4/13/2022.    Breathing about the same but she feels her cough will go away.  Her cough is occasionally productive but not persistently.  She also experiences intermittent wheezing.  She uses her albuterol inhaler and Aerobika twice daily and occasionally gets good mucus return with this.  She does not complain of dyspnea and is able to complete all of her desired activities without limitation.    When asked she says the cough is her most difficult symptom.            Review of Systems:     Review of Systems   Constitutional: Negative for chills, fever and weight loss.   HENT: Negative for congestion.    Respiratory: Positive for cough, sputum production and wheezing. Negative for hemoptysis and shortness of breath.    Gastrointestinal: Negative for abdominal pain, heartburn, nausea and vomiting.   Musculoskeletal: Negative for myalgias.              Physical Examination:     BP (!) 146/73 (BP Location: Right arm, Patient Position: Chair, Cuff Size: Adult Small)   Pulse 78   Ht 1.727 m (5' 8\")   Wt 58.1 kg (128 lb)   SpO2 97%   BMI 19.46 kg/m        Physical Exam  Vitals and nursing note reviewed.   Cardiovascular:      Rate and Rhythm: Normal rate and regular rhythm.      Heart sounds: No murmur heard.  Pulmonary:      Effort: Pulmonary effort is normal.      Breath sounds: No wheezing, rhonchi or rales.   Skin:     General: Skin is warm and dry.   Neurological:      General: No focal deficit present.      Mental Status: She is alert and oriented to person, place, and time.       Fingernails without clubbing        Data:     PFT: 8/2/2022    The FVC is within normal limits, the FEV1 is reduced.  Following administration of bronchodilators there is no significant change in the FVC or FEV1, though there is a moderate change in the FEF 25-75.  The SVC is reduced, the TLC is within normal limits.  The RV/TLC ratio is elevated.  The diffusion capacity is " within normal limits.    IMPRESSION:  Airflow obstruction with borderline bronchodilator response and air trapping with normal diffusion capacity.      CT 8/2/2022  1. Constellation of pulmonary findings in the mid to lower lung zones  including waxing and waning peribronchovascular consolidation,  bronchiectasis, mucoid impaction, and tree-in-bud nodularity. Findings  are suggestive of infectious process such as mycobacterium avium  complex or chronic fungal infection.  2. Scattered small pulmonary nodules, including a new 6 mm nodule in  the right lower lobe. Consider follow-up CT chest in 6-12 months.        All studies listed here were independently reviewed and interpreted by me today.             Medications:     Current Outpatient Medications   Medication     albuterol (PROAIR HFA/PROVENTIL HFA/VENTOLIN HFA) 108 (90 Base) MCG/ACT inhaler     albuterol (PROAIR HFA/PROVENTIL HFA/VENTOLIN HFA) 108 (90 Base) MCG/ACT inhaler     amLODIPine (NORVASC) 5 MG tablet     apixaban ANTICOAGULANT (ELIQUIS) 5 MG tablet     azelastine (ASTELIN) 0.1 % nasal spray     cetirizine (ZYRTEC) 10 MG tablet     flecainide (TAMBOCOR) 50 MG tablet     MAGNESIUM CITRATE PO     MAGNESIUM CITRATE PO     montelukast (SINGULAIR) 10 MG tablet     Multiple Vitamins-Minerals (PRESERVISION AREDS 2 PO)     UNABLE TO FIND     No current facility-administered medications for this visit.             The above note was dictated using voice recognition software and may include typographical errors. Please contact the author for any clarifications.

## 2022-08-02 NOTE — LETTER
8/2/2022         RE: Michelle Crowder  [address] [facility] [address]        Dear Colleague,    Thank you for referring your patient, Michelle Crowder, to the [facility] FOR LUNG SCIENCE AND [facility] [place]. Please see a copy of my visit note below.    Pulmonology Clinic Follow up Visit       Michelle Crowder MRN# [id number]   YOB: 1940 Age: 81 year old     Date of Visit: 8/2/2022    Reason for Visit: Follow-up bronchiectasis          Assessment and Plan:       ## Bronchiectasis  ## Past Aspergillus culture  ## Positive Pseudomonas culture status post eradication therapy  This patient has a lifelong history of frequent bronchitis and pulmonary infections but had no baseline respiratory complaints until May 30 2021 when she developed a pneumonia, following that she had persistent dyspnea.  Etiology of her bronchiectasis is not entirely clear, but she has no recognized exposures, and a negative swallow evaluation for aspiration, has undergone bronchoscopy on 9/17/2021 at [facility] with Aspergillus and Pseudomonas.  Multiple AFBs have been negative.  So it is likely the results of recurrent pneumonia/pulmonary infections early in life. 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  She was also found to have 2 strains of Pseudomonas.  It was unclear whether not this was a colonizer, however she had significant symptomatic benefit with eradication therapy (2 weeks of cefepime) and return of symptoms approximately 1 month after completion of therapy.  This change in her symptoms would suggest the Pseudomonas is a true infection rather than merely a colonizer.  She has previously been prescribed some level of clearance therapy with an Acapella flutter valve, however for much of her past she was inconsistent in its use.  She reports consistently using it twice daily starting in January 2022.  She has tried Brio Ellipta inhaler without  symptomatic benefit.  Her return of symptoms 1 month after completing Pseudomonas eradication therapy suggest that she again has an active infection.  We tried to obtain a sputum culture following her last visit but this was contaminated with oral material.  At this point I will pursue an induced sputum. If this is negative she may benefit from bronchoscopy.  If she is found to have Pseudomonas we will likely pursue a second round of eradication therapy with systemic antibiotics combined with inhaled tobramycin.  I would then continue inhaled tobramycin with 2 weeks of therapy alternating with 2 weeks off of therapy for an initial period of 6 months for maintenance of Pseudomonas control.     -Induced sputum  -If sputum culture is unrevealing, will likely need bronchoscopy with BAL  -Start albuterol, use as needed, but specifically prior to use of flutter valve  -Continue using flutter valve (has Acapella, given Aerobika today) 3-4 times per day as able  -We will arrange a trial of percussion vest  -Pulmonary rehab referral  -Aspergillus is likely a colonizer so we will not focus on this for now.  However, if she continues to be symptomatic after repeat Pseudomonas eradication, she may benefit from additional evaluation for Aspergillus, including Aspergillus specific IgE to assess for ABPA.  -Total and Aspergillus specific IgE     ### JEAN  She follows with sleep medicine in the [facility], uses CPAP consistently at night.  -Continue using CPAP  -Consider future evaluation for pulmonary hypertension if dyspnea persists           ## Health maintenance  She has had 2 Pneumovax shots, the Prevnar, annual flu vaccine, for COVID shots.      Return to clinic: 6 months    I personally spent 49 minutes on the date of the encounter doing chart review, history and exam, documentation and further activities per the note.      Nicolás Cunningham M.D.  Pulmonary & Critical Care  Pager: Click Here to page          History of  "Present Illness / Interval History:     Michelle Crowder is a 81 year old female with H/O atrial fibrillation on apixaban, osteoporosis, history of breast cancer status post mastectomy, hypertension, and bronchiectasis who presents for second opinion for management of her bronchiectasis.      Last seen when she established care on 4/13/2022.    Breathing about the same but she feels her cough will go away.  Her cough is occasionally productive but not persistently.  She also experiences intermittent wheezing.  She uses her albuterol inhaler and Aerobika twice daily and occasionally gets good mucus return with this.  She does not complain of dyspnea and is able to complete all of her desired activities without limitation.    When asked she says the cough is her most difficult symptom.            Review of Systems:     Review of Systems   Constitutional: Negative for chills, fever and weight loss.   HENT: Negative for congestion.    Respiratory: Positive for cough, sputum production and wheezing. Negative for hemoptysis and shortness of breath.    Gastrointestinal: Negative for abdominal pain, heartburn, nausea and vomiting.   Musculoskeletal: Negative for myalgias.              Physical Examination:     BP (!) 146/73 (BP Location: Right arm, Patient Position: Chair, Cuff Size: Adult Small)   Pulse 78   Ht 1.727 m (5' 8\")   Wt 58.1 kg (128 lb)   SpO2 97%   BMI 19.46 kg/m        Physical Exam  Vitals and nursing note reviewed.   Cardiovascular:      Rate and Rhythm: Normal rate and regular rhythm.      Heart sounds: No murmur heard.  Pulmonary:      Effort: Pulmonary effort is normal.      Breath sounds: No wheezing, rhonchi or rales.   Skin:     General: Skin is warm and dry.   Neurological:      General: No focal deficit present.      Mental Status: She is alert and oriented to person, place, and time.       Fingernails without clubbing        Data:     PFT: 8/2/2022    The FVC is within normal limits, the FEV1 " is reduced.  Following administration of bronchodilators there is no significant change in the FVC or FEV1, though there is a moderate change in the FEF 25-75.  The SVC is reduced, the TLC is within normal limits.  The RV/TLC ratio is elevated.  The diffusion capacity is within normal limits.    IMPRESSION:  Airflow obstruction with borderline bronchodilator response and air trapping with normal diffusion capacity.      CT 8/2/2022  1. Constellation of pulmonary findings in the mid to lower lung zones  including waxing and waning peribronchovascular consolidation,  bronchiectasis, mucoid impaction, and tree-in-bud nodularity. Findings  are suggestive of infectious process such as mycobacterium avium  complex or chronic fungal infection.  2. Scattered small pulmonary nodules, including a new 6 mm nodule in  the right lower lobe. Consider follow-up CT chest in 6-12 months.        All studies listed here were independently reviewed and interpreted by me today.             Medications:     Current Outpatient Medications   Medication     albuterol (PROAIR HFA/PROVENTIL HFA/VENTOLIN HFA) 108 (90 Base) MCG/ACT inhaler     albuterol (PROAIR HFA/PROVENTIL HFA/VENTOLIN HFA) 108 (90 Base) MCG/ACT inhaler     amLODIPine (NORVASC) 5 MG tablet     apixaban ANTICOAGULANT (ELIQUIS) 5 MG tablet     azelastine (ASTELIN) 0.1 % nasal spray     cetirizine (ZYRTEC) 10 MG tablet     flecainide (TAMBOCOR) 50 MG tablet     MAGNESIUM CITRATE PO     MAGNESIUM CITRATE PO     montelukast (SINGULAIR) 10 MG tablet     Multiple Vitamins-Minerals (PRESERVISION AREDS 2 PO)     UNABLE TO FIND     No current facility-administered medications for this visit.             The above note was dictated using voice recognition software and may include typographical errors. Please contact the author for any clarifications.          Again, thank you for allowing me to participate in the care of your patient.        Sincerely,        Nicolás Cunningham MD

## 2022-08-02 NOTE — LETTER
Date:August 3, 2022      Patient was self referred, no letter generated. Do not send.        [facility] Information

## 2022-08-02 NOTE — NURSING NOTE
Chief Complaint   Patient presents with     Follow Up     Return appt per pt      Vitals were taken and medications were reconciled.     Kristine Rievrs RMA  11:59 AM

## 2022-08-03 ENCOUNTER — TELEPHONE (OUTPATIENT)
Dept: PULMONOLOGY | Facility: CLINIC | Age: 82
End: 2022-08-03

## 2022-08-03 LAB
DLCOUNC-%PRED-PRE: 73 %
DLCOUNC-PRE: 15.88 ML/MIN/MMHG
DLCOUNC-PRED: 21.51 ML/MIN/MMHG
ERV-%PRED-PRE: 91 %
ERV-PRE: 0.92 L
ERV-PRED: 1 L
EXPTIME-PRE: 8.87 SEC
FEF2575-%PRED-POST: 53 %
FEF2575-%PRED-PRE: 46 %
FEF2575-POST: 0.92 L/SEC
FEF2575-PRE: 0.8 L/SEC
FEF2575-PRED: 1.72 L/SEC
FEFMAX-%PRED-PRE: 92 %
FEFMAX-PRE: 5.1 L/SEC
FEFMAX-PRED: 5.52 L/SEC
FEV1-%PRED-PRE: 66 %
FEV1-PRE: 1.49 L
FEV1FEV6-PRE: 68 %
FEV1FEV6-PRED: 78 %
FEV1FVC-PRE: 67 %
FEV1FVC-PRED: 77 %
FEV1SVC-PRE: 74 %
FEV1SVC-PRED: 66 %
FIFMAX-PRE: 2.68 L/SEC
FRCPLETH-%PRED-PRE: 131 %
FRCPLETH-PRE: 3.87 L
FRCPLETH-PRED: 2.95 L
FVC-%PRED-PRE: 74 %
FVC-PRE: 2.21 L
FVC-PRED: 2.95 L
IC-%PRED-PRE: 45 %
IC-PRE: 1.09 L
IC-PRED: 2.38 L
RVPLETH-%PRED-PRE: 123 %
RVPLETH-PRE: 2.95 L
RVPLETH-PRED: 2.39 L
TLCPLETH-%PRED-PRE: 88 %
TLCPLETH-PRE: 4.96 L
TLCPLETH-PRED: 5.61 L
VA-%PRED-PRE: 61 %
VA-PRE: 3.35 L
VC-%PRED-PRE: 59 %
VC-PRE: 2.01 L
VC-PRED: 3.38 L

## 2022-08-03 ASSESSMENT — ENCOUNTER SYMPTOMS
NAUSEA: 0
HEARTBURN: 0
FEVER: 0
WEIGHT LOSS: 0
SHORTNESS OF BREATH: 0
SPUTUM PRODUCTION: 1
VOMITING: 0
MYALGIAS: 0
CHILLS: 0
HEMOPTYSIS: 0
ABDOMINAL PAIN: 0
WHEEZING: 1
COUGH: 1

## 2022-08-03 NOTE — TELEPHONE ENCOUNTER
Fax cover sheet, demographic sheet, pulmonary rehab order, most recent signed office notes and most recent PFT results faxed to Pulmonary Rehab at [facility] [phone]

## 2022-08-03 NOTE — TELEPHONE ENCOUNTER
Fax cover sheet, demographic sheet, order for sputum induction and 7% hypertonic saline neb (as instructed by RT at [place]) and culture orders faxed to the PFT lab at [facility] with request for results to be faxed back (return fax number provided)

## 2022-08-04 LAB
A FUMIGATUS IGE QN: <0.1 KU(A)/L
IGE SERPL-ACNC: 36 KU/L (ref 0–114)

## 2022-08-09 LAB — A FUMIGATUS IGG SER-MCNC: 48.5 MCG/ML

## 2022-10-03 ENCOUNTER — HEALTH MAINTENANCE LETTER (OUTPATIENT)
Age: 82
End: 2022-10-03

## 2022-12-01 ENCOUNTER — TELEPHONE (OUTPATIENT)
Dept: PULMONOLOGY | Facility: CLINIC | Age: 82
End: 2022-12-01

## 2023-01-05 ENCOUNTER — TELEPHONE (OUTPATIENT)
Dept: PULMONOLOGY | Facility: CLINIC | Age: 83
End: 2023-01-05

## 2023-02-14 ENCOUNTER — MYC MEDICAL ADVICE (OUTPATIENT)
Dept: PULMONOLOGY | Facility: CLINIC | Age: 83
End: 2023-02-14
Payer: COMMERCIAL

## 2023-02-15 NOTE — TELEPHONE ENCOUNTER
"Spoke with Eber from PFT lab at [place] in [place]. Discussed that orders for sputum induction, 7% hypertonic saline and culture orders were faxed to [place] back in August, but patient had informed us it was initially scheduled in Sept 2022 but cancelled as she was told \"they are waiting for more orders.\" Eber states she is able to visualize the order for sputum induction and informed her it wasn't something specific other than getting a sample and submitting it for cultures. She is unsure why this was not scheduled and will speak to her supervisor regarding why this was delayed. Discussed we are trying to get this completed prior to follow up appt she has with Dr. Cunningham in March. Provided her my direct number and she would call. Also obtained her direct contact.     Spoke with pt and relayed information. She states she let it go as she assumed [place] would reach out to us, but to our knowledge, there was no communication. Discussed that other possibility is for patient to complete it same day as seeing Dr. Cunningham in March prior to appt and she states she would be agreeable to this if Lindsay is unable to complete this. Informed her I would keep her updated and follow up with her end of the week. She voiced understanding.   "

## 2023-02-16 NOTE — TELEPHONE ENCOUNTER
Received update from RT Alyssa, from [place] PFT lab regarding sputum induction. She states the initial issue was Dr. Cunningham was not in their database at the time patient's appt was scheduled in Sept and needed to be put in by IT. This was completed, but the task slipped off the radar. She is now scheduled for sputum induction on 2/21 and discussed the gram stain and fungal/yeast lab orders to be done on the sputum. They do have the orders and discussed if once the sputum sample is obtained if this could be sent down to their lab and have results be faxed back to [phone] ATTN Dr. Cunningham. Alyssa is agreeable and informed her to have lab call my direct line should they have questions.

## 2023-03-09 ENCOUNTER — TELEPHONE (OUTPATIENT)
Dept: PULMONOLOGY | Facility: CLINIC | Age: 83
End: 2023-03-09
Payer: COMMERCIAL

## 2023-03-09 NOTE — TELEPHONE ENCOUNTER
Pt called and states that she wont be able to make it in person due to anticipated weather and feeling ill. Discussed video visit and pt agreeable. She voiced understanding of needing to be in state of [place]. Appt switched and details confirmed with pt

## 2023-03-14 ENCOUNTER — VIRTUAL VISIT (OUTPATIENT)
Dept: PULMONOLOGY | Facility: CLINIC | Age: 83
End: 2023-03-14
Attending: INTERNAL MEDICINE
Payer: COMMERCIAL

## 2023-03-14 DIAGNOSIS — J47.9 BRONCHIECTASIS WITHOUT COMPLICATION (H): Primary | ICD-10-CM

## 2023-03-14 PROCEDURE — 99215 OFFICE O/P EST HI 40 MIN: CPT | Mod: VID | Performed by: INTERNAL MEDICINE

## 2023-03-14 PROCEDURE — G0463 HOSPITAL OUTPT CLINIC VISIT: HCPCS | Mod: PN,GT | Performed by: INTERNAL MEDICINE

## 2023-03-14 RX ORDER — VITAMIN E 268 MG
400 CAPSULE ORAL DAILY
COMMUNITY

## 2023-03-14 RX ORDER — LOSARTAN POTASSIUM 100 MG/1
1 TABLET ORAL DAILY
COMMUNITY
Start: 2022-08-04

## 2023-03-14 RX ORDER — FLUTICASONE PROPIONATE 50 MCG
1 SPRAY, SUSPENSION (ML) NASAL DAILY
COMMUNITY
Start: 2022-12-07 | End: 2023-12-12

## 2023-03-14 RX ORDER — METOPROLOL TARTRATE 25 MG/1
25 TABLET, FILM COATED ORAL 2 TIMES DAILY
COMMUNITY
Start: 2022-08-31 | End: 2023-09-05

## 2023-03-14 NOTE — NURSING NOTE
Is the patient currently in the state of [place]? YES    Visit mode:VIDEO    If the visit is dropped, the patient can be reconnected by: VIDEO VISIT:  Send e-mail to at [email or web address]    Will anyone else be joining the visit? No  (If patient encounters technical issues they should call [phone])    How would you like to obtain your AVS? MyChart    Are changes needed to the allergy or medication list? YES duplicate albuterol inhaler. no longer using astelin nasal spray, magnesium dose changed to 400 mg daily. Elilquis dose is now 2.5 mg dialy, not 5 mg.     Michelle didn't have a recent blood pressure to report other than from a couple weeks ago. She said it's usually 105-115 and couldn't remember diastolic.    Reason for visit: 6 month follow up    TANGELA Williamson

## 2023-03-14 NOTE — PROGRESS NOTES
Video-Visit Details    Type of service:  Video Visit    Video Start Time (time video started): 3:48 PM      Video End Time (time video stopped): 4:13    Originating Location (pt. Location): Home        Distant Location (provider location):  On-site    Mode of Communication:  Video Conference via [facility]          Pulmonology Clinic Follow up Visit       Michelle Crowder MRN# [id number]   YOB: 1940 Age: 82 year old     Date of Visit: 3/14/2023    Reason for Visit: Follow-up bronchiectasis          Assessment and Plan:         ## Bronchiectasis  ## Past Aspergillus culture  ## Positive Pseudomonas culture status post eradication therapy  This patient has a lifelong history of frequent bronchitis and pulmonary infections but had no baseline respiratory complaints until May 30 2021 when she developed a pneumonia, following that she had persistent dyspnea.  Etiology of her bronchiectasis is not entirely clear, but she has no recognized exposures, and a negative swallow evaluation for aspiration, has undergone bronchoscopy on 9/17/2021 at [facility] with Aspergillus and Pseudomonas.  Multiple AFBs have been negative.  So it is likely the results of recurrent pneumonia/pulmonary infections early in life. 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  In the past she has grown multiple strains of Pseudomonas. It was unclear whether not this was a colonizer.  She had significant symptomatic benefit with eradication therapy (2 weeks of cefepime) and return of symptoms approximately 1 month after completion of therapy which might suggest that it is more than just colonizer, however repeat sputum testing has been negative for Pseudomonas.  Total IgE and ABPA panel was within normal limits on 8/2.  Sputum culture at that time was positive only for Candida (ordered at her last visit but conducted at her local hospital) she was also found to have 2 strains of Pseudomonas.  "    She was prescribed clearance therapy with an Aerobika but has not been using it consistently.  I advised her to resume using this now.    She was treated with inhaled tobramycin alternating 2 weeks on with 2 weeks off for initial period of 6 months.  Since that time it seems that her exacerbations have been significantly reduced.    -Continue albuterol MDI, use as needed, but specifically prior to use of flutter valve  -Resume using flutter valve 3-4 times per day as able  -If flutter valve is ineffective will consider trial of percussion vest.  -Additionally, if her symptoms worsen or frequent exacerbations occur she may benefit from bronchoscopy to assess for occult infection       ### JEAN  She follows with sleep medicine in the [facility], uses CPAP consistently at night.  -Continue using CPAP  -Consider future evaluation for pulmonary hypertension if dyspnea persists          ## Health maintenance  She has had 2 Pneumovax shots, the Prevnar, annual flu vaccine, for COVID shots.         Return to clinic: 6 months    I personally spent 42 minutes on the date of the encounter doing chart review, history and exam, documentation and further activities per the note.      Nicolás Cunningham M.D.  Pulmonary & Critical Care  Pager: Click Here to page          History of Present Illness / Interval History:     Michelle Crowder is a 82 year old female with H/O atrial fibrillation on apixaban, osteoporosis, history of breast cancer status post mastectomy, hypertension, and bronchiectasis who presents for second opinion for management of her bronchiectasis.    Last seen: 8/2/22    She reports that her symptoms are \"not so bad right now\".  She had COVID on 3/3.  She thought it was a bad cold at first so did not initially test.  She did test 5 days after symptom onset which was positive.  She noted increased dyspnea, new onset fatigue, and felt that she had a high fever, though she did not measure it.    She feels " that her cough had gotten slightly worse since she was last seen, then acutely worse with COVID, now back to pre-COVID baseline, but not back to her baseline where she was before she was last seen.  Her cough is usually dry but every 2 to 3 days she is able to mobilize some mucus.  She is not using her albuterol much, only a few times a week.    She continues to work with pulmonary rehab and is now on phase 3 with twice weekly visits doing the treadmill, bike, and weights.    She continues to use her Singulair, Mucinex, and Zyrtec.  She has not been using her flutter valve              Review of Systems:     Review of Systems   Constitutional: Negative for chills, fever and weight loss.   Respiratory: Positive for cough, sputum production, shortness of breath and wheezing.             Physical Examination:     Alert and oriented   Breathing well and talking in full sentences without distress          Data:     Sputum culture from outside facility positive for Candida  IgE and ABPA panel within normal limits        Medications:     Current Outpatient Medications   Medication     albuterol (PROAIR HFA/PROVENTIL HFA/VENTOLIN HFA) 108 (90 Base) MCG/ACT inhaler     amLODIPine (NORVASC) 5 MG tablet     apixaban ANTICOAGULANT (ELIQUIS) 2.5 MG tablet     cetirizine (ZYRTEC) 10 MG tablet     flecainide (TAMBOCOR) 50 MG tablet     fluticasone (FLONASE) 50 MCG/ACT nasal spray     losartan (COZAAR) 100 MG tablet     MAGNESIUM CITRATE PO     metoprolol tartrate (LOPRESSOR) 25 MG tablet     montelukast (SINGULAIR) 10 MG tablet     Multiple Vitamins-Minerals (PRESERVISION AREDS 2 PO)     sodium chloride inhalant 7 % NEBU neb solution     UNABLE TO FIND     vitamin E (TOCOPHEROL) 400 units (180 mg) capsule     albuterol (PROAIR HFA/PROVENTIL HFA/VENTOLIN HFA) 108 (90 Base) MCG/ACT inhaler     apixaban ANTICOAGULANT (ELIQUIS) 5 MG tablet     azelastine (ASTELIN) 0.1 % nasal spray     MAGNESIUM CITRATE PO     No current  facility-administered medications for this visit.             The above note was dictated using voice recognition software and may include typographical errors. Please contact the author for any clarifications.

## 2023-03-14 NOTE — LETTER
3/14/2023         RE: Michelle Crowder  [address] [facility] [address]        Dear Colleague,    Thank you for referring your patient, Michelle Crowder, to the [facility] FOR LUNG SCIENCE AND [facility] [place]. Please see a copy of my visit note below.      Pulmonology Clinic Follow up Visit       Michelle Crowdre MRN# [id number]   YOB: 1940 Age: 82 year old     Date of Visit: 3/14/2023    Reason for Visit: Follow-up bronchiectasis          Assessment and Plan:         ## Bronchiectasis  ## Past Aspergillus culture  ## Positive Pseudomonas culture status post eradication therapy  This patient has a lifelong history of frequent bronchitis and pulmonary infections but had no baseline respiratory complaints until May 30 2021 when she developed a pneumonia, following that she had persistent dyspnea.  Etiology of her bronchiectasis is not entirely clear, but she has no recognized exposures, and a negative swallow evaluation for aspiration, has undergone bronchoscopy on 9/17/2021 at [facility] with Aspergillus and Pseudomonas.  Multiple AFBs have been negative.  So it is likely the results of recurrent pneumonia/pulmonary infections early in life. 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  In the past she has grown multiple strains of Pseudomonas. It was unclear whether not this was a colonizer.  She had significant symptomatic benefit with eradication therapy (2 weeks of cefepime) and return of symptoms approximately 1 month after completion of therapy which might suggest that it is more than just colonizer, however repeat sputum testing has been negative for Pseudomonas.  Total IgE and ABPA panel was within normal limits on 8/2.  Sputum culture at that time was positive only for Candida (ordered at her last visit but conducted at her local hospital) she was also found to have 2 strains of Pseudomonas.     She was prescribed clearance  "therapy with an Aerobika but has not been using it consistently.  I advised her to resume using this now.    She was treated with inhaled tobramycin alternating 2 weeks on with 2 weeks off for initial period of 6 months.  Since that time it seems that her exacerbations have been significantly reduced.    -Continue albuterol MDI, use as needed, but specifically prior to use of flutter valve  -Resume using flutter valve 3-4 times per day as able  -If flutter valve is ineffective will consider trial of percussion vest.  -Additionally, if her symptoms worsen or frequent exacerbations occur she may benefit from bronchoscopy to assess for occult infection       ### JEAN  She follows with sleep medicine in the [facility], uses CPAP consistently at night.  -Continue using CPAP  -Consider future evaluation for pulmonary hypertension if dyspnea persists          ## Health maintenance  She has had 2 Pneumovax shots, the Prevnar, annual flu vaccine, for COVID shots.         Return to clinic: 6 months    I personally spent 42 minutes on the date of the encounter doing chart review, history and exam, documentation and further activities per the note.      Nicolás Cunningham M.D.  Pulmonary & Critical Care  Pager: Click Here to page          History of Present Illness / Interval History:     Michelle Crowder is a 82 year old female with H/O atrial fibrillation on apixaban, osteoporosis, history of breast cancer status post mastectomy, hypertension, and bronchiectasis who presents for second opinion for management of her bronchiectasis.    Last seen: 8/2/22    She reports that her symptoms are \"not so bad right now\".  She had COVID on 3/3.  She thought it was a bad cold at first so did not initially test.  She did test 5 days after symptom onset which was positive.  She noted increased dyspnea, new onset fatigue, and felt that she had a high fever, though she did not measure it.    She feels that her cough had gotten slightly " worse since she was last seen, then acutely worse with COVID, now back to pre-COVID baseline, but not back to her baseline where she was before she was last seen.  Her cough is usually dry but every 2 to 3 days she is able to mobilize some mucus.  She is not using her albuterol much, only a few times a week.    She continues to work with pulmonary rehab and is now on phase 3 with twice weekly visits doing the treadmill, bike, and weights.    She continues to use her Singulair, Mucinex, and Zyrtec.  She has not been using her flutter valve              Review of Systems:     Review of Systems   Constitutional: Negative for chills, fever and weight loss.   Respiratory: Positive for cough, sputum production, shortness of breath and wheezing.             Physical Examination:     Alert and oriented   Breathing well and talking in full sentences without distress          Data:     Sputum culture from outside facility positive for Candida  IgE and ABPA panel within normal limits        Medications:     Current Outpatient Medications   Medication     albuterol (PROAIR HFA/PROVENTIL HFA/VENTOLIN HFA) 108 (90 Base) MCG/ACT inhaler     amLODIPine (NORVASC) 5 MG tablet     apixaban ANTICOAGULANT (ELIQUIS) 2.5 MG tablet     cetirizine (ZYRTEC) 10 MG tablet     flecainide (TAMBOCOR) 50 MG tablet     fluticasone (FLONASE) 50 MCG/ACT nasal spray     losartan (COZAAR) 100 MG tablet     MAGNESIUM CITRATE PO     metoprolol tartrate (LOPRESSOR) 25 MG tablet     montelukast (SINGULAIR) 10 MG tablet     Multiple Vitamins-Minerals (PRESERVISION AREDS 2 PO)     sodium chloride inhalant 7 % NEBU neb solution     UNABLE TO FIND     vitamin E (TOCOPHEROL) 400 units (180 mg) capsule     albuterol (PROAIR HFA/PROVENTIL HFA/VENTOLIN HFA) 108 (90 Base) MCG/ACT inhaler     apixaban ANTICOAGULANT (ELIQUIS) 5 MG tablet     azelastine (ASTELIN) 0.1 % nasal spray     MAGNESIUM CITRATE PO     No current facility-administered medications for this  visit.       The above note was dictated using voice recognition software and may include typographical errors. Please contact the author for any clarifications.    Again, thank you for allowing me to participate in the care of your patient.        Sincerely,        Nicolás Cunningham MD

## 2023-03-21 ASSESSMENT — ENCOUNTER SYMPTOMS
COUGH: 1
SHORTNESS OF BREATH: 1
FEVER: 0
SPUTUM PRODUCTION: 1
CHILLS: 0
WHEEZING: 1
WEIGHT LOSS: 0

## 2023-04-04 ENCOUNTER — TELEPHONE (OUTPATIENT)
Dept: PULMONOLOGY | Facility: CLINIC | Age: 83
End: 2023-04-04
Payer: COMMERCIAL

## 2023-05-01 RX ORDER — ALBUTEROL SULFATE 90 UG/1
AEROSOL, METERED RESPIRATORY (INHALATION)
Qty: 34 G | Refills: 4 | OUTPATIENT
Start: 2023-05-01

## 2023-06-06 ENCOUNTER — TELEPHONE (OUTPATIENT)
Dept: PULMONOLOGY | Facility: CLINIC | Age: 83
End: 2023-06-06
Payer: COMMERCIAL

## 2023-06-06 NOTE — TELEPHONE ENCOUNTER
PATIENT CONTACTED    Appointment type: RTN VIDEO  Provider: NIKKIE  Return date: 9/19/23  Specialty phone number: [phone]  Additional appointment(s) needed: NA  Additonal Notes: RESCHEDULED. PT WILL BE IN [place].

## 2023-08-13 ENCOUNTER — HEALTH MAINTENANCE LETTER (OUTPATIENT)
Age: 83
End: 2023-08-13

## 2023-08-15 ENCOUNTER — TELEPHONE (OUTPATIENT)
Dept: PULMONOLOGY | Facility: CLINIC | Age: 83
End: 2023-08-15
Payer: COMMERCIAL

## 2023-11-08 ENCOUNTER — VIRTUAL VISIT (OUTPATIENT)
Dept: PULMONOLOGY | Facility: CLINIC | Age: 83
End: 2023-11-08
Attending: INTERNAL MEDICINE
Payer: COMMERCIAL

## 2023-11-08 DIAGNOSIS — J47.1 BRONCHIECTASIS WITH ACUTE EXACERBATION (H): ICD-10-CM

## 2023-11-08 DIAGNOSIS — J47.9 BRONCHIECTASIS WITHOUT COMPLICATION (H): Primary | ICD-10-CM

## 2023-11-08 PROCEDURE — 99213 OFFICE O/P EST LOW 20 MIN: CPT | Mod: VID | Performed by: INTERNAL MEDICINE

## 2023-11-08 RX ORDER — MONTELUKAST SODIUM 10 MG/1
1 TABLET ORAL AT BEDTIME
Qty: 30 TABLET | Refills: 11 | Status: SHIPPED | OUTPATIENT
Start: 2023-11-08 | End: 2024-05-15

## 2023-11-08 RX ORDER — ALBUTEROL SULFATE 90 UG/1
2 AEROSOL, METERED RESPIRATORY (INHALATION) EVERY 4 HOURS PRN
Qty: 18 G | Refills: 11 | Status: SHIPPED | OUTPATIENT
Start: 2023-11-08 | End: 2024-05-15

## 2023-11-08 ASSESSMENT — PAIN SCALES - GENERAL: PAINLEVEL: NO PAIN (0)

## 2023-11-08 NOTE — PATIENT INSTRUCTIONS
Increase the albuterol inhaler to at least three times per day.  Increase flutter valve use to at least three times per day, use it after the albuterol.

## 2023-11-08 NOTE — NURSING NOTE
Is the patient currently in the state of [place]? YES    Visit mode:VIDEO    If the visit is dropped, the patient can be reconnected by: VIDEO VISIT: Text to cell phone:   Telephone Information:   Mobile [phone]       Will anyone else be joining the visit? NO  (If patient encounters technical issues they should call [phone]    How would you like to obtain your AVS? MyChart    Are changes needed to the allergy or medication list? No    Reason for visit: No chief complaint on file.    Dianna HONEYCUTT

## 2023-11-08 NOTE — LETTER
11/8/2023         RE: Michelle Rodriguez S  [address]  [facility] [address]        Dear Colleague,    Thank you for referring your patient, Michelle Crowder, to the [facility] FOR LUNG SCIENCE AND [facility]. Please see a copy of my visit note below.    Virtual Visit Details    Type of service:  Video Visit   Video Start Time: 11:08 AM  Video End Time:11:21 AM    Originating Location (pt. Location): Home    Distant Location (provider location):  On-site  Platform used for Video Visit: [facility]        Pulmonology Clinic Follow up Visit       Michelle Crowder MRN# [id number]   YOB: 1940 Age: 83 year old     Date of Visit: 11/8/2023    Reason for Visit: Follow-up bronchiectasis          Assessment and Plan:     ## Bronchiectasis  ## Past Aspergillus culture  ## Positive Pseudomonas culture status post eradication therapy  This patient has a lifelong history of frequent bronchitis and pulmonary infections but had no baseline respiratory complaints until May 30 2021 when she developed a pneumonia, following that she had persistent dyspnea.  Etiology of her bronchiectasis is not entirely clear, but she has no recognized exposures, and a negative swallow evaluation for aspiration, has undergone bronchoscopy on 9/17/2021 at [facility] with Aspergillus and Pseudomonas.  Multiple AFBs have been negative.  So it is likely the results of recurrent pneumonia/pulmonary infections early in life. 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  In the past she has grown multiple strains of Pseudomonas. It was unclear whether not this was a colonizer.  She had significant symptomatic benefit with eradication therapy (2 weeks of cefepime) and return of symptoms approximately 1 month after completion of therapy which might suggest that it is more than just colonizer, however repeat sputum testing has been negative for Pseudomonas.  Total  IgE and ABPA panel was within normal limits on 8/2.  Sputum culture at that time was positive only for Candida (ordered at her last visit but conducted at her local hospital) she was also found to have 2 strains of Pseudomonas.      She was prescribed clearance therapy with an Aerobika but has not been using it consistently.  I advised her to resume using this now.     She was treated with inhaled tobramycin alternating 2 weeks on with 2 weeks off for initial period of 6 months.  Since that time it seems that her exacerbations have been significantly reduced.     -Continue albuterol MDI, use as needed, but specifically prior to use of flutter valve  -Resume using flutter valve 3-4 times per day as able  -Discussed vest but she doesn't want it  -Additionally, if her symptoms worsen or frequent exacerbations occur she may benefit from bronchoscopy to assess for occult infection        ### JEAN  She follows with sleep medicine in the [facility], uses CPAP consistently at night.  -Continue using CPAP  -Consider future evaluation for pulmonary hypertension if dyspnea persists      ## Health maintenance   Has had covid and flu this year. Recommended  RSV vaccine as well.      Return to clinic: 6 months    I personally spent 22 minutes on the date of the encounter doing chart review, history and exam, documentation and further activities per the note.      Nicolás Cunningham M.D.  Pulmonary & Critical Care  Pager: Click Here to page          History of Present Illness / Interval History:     Michelle Crowder is a 83 year old female with H/O atrial fibrillation on apixaban, osteoporosis, history of breast cancer status post mastectomy, hypertension, and bronchiectasis who presents for second opinion for management of her bronchiectasis.     Last seen: 3/14/23    Cough maybe a bit more intense but not more frequent. Coughs all day long.    Mild wheezing associated with sleeping spells but not at other times    Still  "usign inhaler daily and taking singulair.    Mucous clearance seems to be the biggest issue    Has leisa that she prefers to the aerobika  Using about once a day, 10-15 breaths per use. We discussed other clearance options but she said \"I dont' want to go near that vest thing\"    Engaged in pulm rehab, thinks it's helping and keeping her active         Review of Systems:     Review of Systems   Constitutional:  Negative for chills, fever, malaise/fatigue and weight loss.   Respiratory:  Positive for cough, sputum production and wheezing. Negative for hemoptysis and shortness of breath.             Physical Examination:     GENERAL: Healthy, alert and no distress  EYES: Eyes grossly normal to inspection.  No discharge or erythema, or obvious scleral/conjunctival abnormalities.  RESP: No audible wheeze, cough, or visible cyanosis.  No visible retractions or increased work of breathing.    SKIN: Visible skin clear. No significant rash, abnormal pigmentation or lesions.  NEURO: Cranial nerves grossly intact.  Mentation and speech appropriate for age.  PSYCH: Mentation appears normal, affect normal/bright, judgement and insight intact, normal speech and appearance well-groomed.      Physical Exam          Data:     No significant new data since last seen        Medications:     Current Outpatient Medications   Medication    albuterol (PROAIR HFA/PROVENTIL HFA/VENTOLIN HFA) 108 (90 Base) MCG/ACT inhaler    apixaban ANTICOAGULANT (ELIQUIS) 2.5 MG tablet    cetirizine (ZYRTEC) 10 MG tablet    flecainide (TAMBOCOR) 50 MG tablet    fluticasone (FLONASE) 50 MCG/ACT nasal spray    losartan (COZAAR) 100 MG tablet    MAGNESIUM CITRATE PO    montelukast (SINGULAIR) 10 MG tablet    Multiple Vitamins-Minerals (PRESERVISION AREDS 2 PO)    UNABLE TO FIND    vitamin E (TOCOPHEROL) 400 units (180 mg) capsule    albuterol (PROAIR HFA/PROVENTIL HFA/VENTOLIN HFA) 108 (90 Base) MCG/ACT inhaler    amLODIPine (NORVASC) 5 MG tablet    " apixaban ANTICOAGULANT (ELIQUIS) 5 MG tablet    azelastine (ASTELIN) 0.1 % nasal spray    MAGNESIUM CITRATE PO    metoprolol tartrate (LOPRESSOR) 25 MG tablet    sodium chloride inhalant 7 % NEBU neb solution     No current facility-administered medications for this visit.             The above note was dictated using voice recognition software and may include typographical errors. Please contact the author for any clarifications.        Nicolás Cunningham MD

## 2023-11-08 NOTE — PROGRESS NOTES
Virtual Visit Details    Type of service:  Video Visit   Video Start Time: 11:08 AM  Video End Time:11:21 AM    Originating Location (pt. Location): Home    Distant Location (provider location):  On-site  Platform used for Video Visit: [employer]        Pulmonology Clinic Follow up Visit       Michelle Crowder MRN# [id number]   YOB: 1940 Age: 83 year old     Date of Visit: 11/8/2023    Reason for Visit: Follow-up bronchiectasis          Assessment and Plan:     ## Bronchiectasis  ## Past Aspergillus culture  ## Positive Pseudomonas culture status post eradication therapy  This patient has a lifelong history of frequent bronchitis and pulmonary infections but had no baseline respiratory complaints until May 30 2021 when she developed a pneumonia, following that she had persistent dyspnea.  Etiology of her bronchiectasis is not entirely clear, but she has no recognized exposures, and a negative swallow evaluation for aspiration, has undergone bronchoscopy on 9/17/2021 at [facility] with Aspergillus and Pseudomonas.  Multiple AFBs have been negative.  So it is likely the results of recurrent pneumonia/pulmonary infections early in life. 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  In the past she has grown multiple strains of Pseudomonas. It was unclear whether not this was a colonizer.  She had significant symptomatic benefit with eradication therapy (2 weeks of cefepime) and return of symptoms approximately 1 month after completion of therapy which might suggest that it is more than just colonizer, however repeat sputum testing has been negative for Pseudomonas.  Total IgE and ABPA panel was within normal limits on 8/2.  Sputum culture at that time was positive only for Candida (ordered at her last visit but conducted at her local hospital) she was also found to have 2 strains of Pseudomonas.      She was prescribed clearance therapy with an Aerobika but has not been  "using it consistently.  I advised her to resume using this now.     She was treated with inhaled tobramycin alternating 2 weeks on with 2 weeks off for initial period of 6 months.  Since that time it seems that her exacerbations have been significantly reduced.     -Continue albuterol MDI, use as needed, but specifically prior to use of flutter valve  -Resume using flutter valve 3-4 times per day as able  -Discussed vest but she doesn't want it  -Additionally, if her symptoms worsen or frequent exacerbations occur she may benefit from bronchoscopy to assess for occult infection        ### JEAN  She follows with sleep medicine in the [facility], uses CPAP consistently at night.  -Continue using CPAP  -Consider future evaluation for pulmonary hypertension if dyspnea persists      ## Health maintenance   Has had covid and flu this year. Recommended  RSV vaccine as well.      Return to clinic: 6 months    I personally spent 22 minutes on the date of the encounter doing chart review, history and exam, documentation and further activities per the note.      Nicolás Cunningham M.D.  Pulmonary & Critical Care  Pager: Click Here to page          History of Present Illness / Interval History:     Michelle Crowder is a 83 year old female with H/O atrial fibrillation on apixaban, osteoporosis, history of breast cancer status post mastectomy, hypertension, and bronchiectasis who presents for second opinion for management of her bronchiectasis.     Last seen: 3/14/23    Cough maybe a bit more intense but not more frequent. Coughs all day long.    Mild wheezing associated with sleeping spells but not at other times    Still usign inhaler daily and taking singulair.    Mucous clearance seems to be the biggest issue    Has leisa that she prefers to the aerobika  Using about once a day, 10-15 breaths per use. We discussed other clearance options but she said \"I dont' want to go near that vest thing\"    Engaged in pulm rehab, " thinks it's helping and keeping her active         Review of Systems:     Review of Systems   Constitutional:  Negative for chills, fever, malaise/fatigue and weight loss.   Respiratory:  Positive for cough, sputum production and wheezing. Negative for hemoptysis and shortness of breath.             Physical Examination:     GENERAL: Healthy, alert and no distress  EYES: Eyes grossly normal to inspection.  No discharge or erythema, or obvious scleral/conjunctival abnormalities.  RESP: No audible wheeze, cough, or visible cyanosis.  No visible retractions or increased work of breathing.    SKIN: Visible skin clear. No significant rash, abnormal pigmentation or lesions.  NEURO: Cranial nerves grossly intact.  Mentation and speech appropriate for age.  PSYCH: Mentation appears normal, affect normal/bright, judgement and insight intact, normal speech and appearance well-groomed.      Physical Exam          Data:     No significant new data since last seen        Medications:     Current Outpatient Medications   Medication    albuterol (PROAIR HFA/PROVENTIL HFA/VENTOLIN HFA) 108 (90 Base) MCG/ACT inhaler    apixaban ANTICOAGULANT (ELIQUIS) 2.5 MG tablet    cetirizine (ZYRTEC) 10 MG tablet    flecainide (TAMBOCOR) 50 MG tablet    fluticasone (FLONASE) 50 MCG/ACT nasal spray    losartan (COZAAR) 100 MG tablet    MAGNESIUM CITRATE PO    montelukast (SINGULAIR) 10 MG tablet    Multiple Vitamins-Minerals (PRESERVISION AREDS 2 PO)    UNABLE TO FIND    vitamin E (TOCOPHEROL) 400 units (180 mg) capsule    albuterol (PROAIR HFA/PROVENTIL HFA/VENTOLIN HFA) 108 (90 Base) MCG/ACT inhaler    amLODIPine (NORVASC) 5 MG tablet    apixaban ANTICOAGULANT (ELIQUIS) 5 MG tablet    azelastine (ASTELIN) 0.1 % nasal spray    MAGNESIUM CITRATE PO    metoprolol tartrate (LOPRESSOR) 25 MG tablet    sodium chloride inhalant 7 % NEBU neb solution     No current facility-administered medications for this visit.             The above note was  dictated using voice recognition software and may include typographical errors. Please contact the author for any clarifications.

## 2023-11-09 ENCOUNTER — TELEPHONE (OUTPATIENT)
Dept: PULMONOLOGY | Facility: CLINIC | Age: 83
End: 2023-11-09
Payer: COMMERCIAL

## 2023-11-09 NOTE — TELEPHONE ENCOUNTER
Left Voicemail (1st Attempt) for the patient to call back and schedule the following:    Appointment type: SHAWANDA  Provider: NIKKIE  Return date: 05/2024  Specialty phone number: [phone]  Additional appointment(s) needed: N/A  Additonal Notes: N/A

## 2023-11-13 ENCOUNTER — TELEPHONE (OUTPATIENT)
Dept: PULMONOLOGY | Facility: CLINIC | Age: 83
End: 2023-11-13
Payer: COMMERCIAL

## 2023-11-13 NOTE — TELEPHONE ENCOUNTER
Patient Contacted for the patient to call back and schedule the following:    Appointment type: SHAWANDA  Provider: NIKKIE  Return date: 05/08/2024  Specialty phone number: [phone]  Additional appointment(s) needed: N/A  Additonal Notes: Call pt back this afternoon, anytime after 1PM

## 2023-12-11 ASSESSMENT — ENCOUNTER SYMPTOMS
SPUTUM PRODUCTION: 1
HEMOPTYSIS: 0
CHILLS: 0
COUGH: 1
FEVER: 0
WHEEZING: 1
WEIGHT LOSS: 0
SHORTNESS OF BREATH: 0

## 2024-05-15 ENCOUNTER — VIRTUAL VISIT (OUTPATIENT)
Dept: PULMONOLOGY | Facility: CLINIC | Age: 84
End: 2024-05-15
Attending: INTERNAL MEDICINE
Payer: COMMERCIAL

## 2024-05-15 VITALS — BODY MASS INDEX: 20.07 KG/M2 | WEIGHT: 132 LBS

## 2024-05-15 DIAGNOSIS — J47.9 BRONCHIECTASIS WITHOUT COMPLICATION (H): ICD-10-CM

## 2024-05-15 DIAGNOSIS — J47.1 BRONCHIECTASIS WITH ACUTE EXACERBATION (H): ICD-10-CM

## 2024-05-15 PROCEDURE — 99213 OFFICE O/P EST LOW 20 MIN: CPT | Mod: 95 | Performed by: INTERNAL MEDICINE

## 2024-05-15 RX ORDER — FLUTICASONE PROPIONATE AND SALMETEROL 100; 50 UG/1; UG/1
1 POWDER RESPIRATORY (INHALATION) EVERY 12 HOURS
Qty: 60 EACH | Refills: 11 | Status: SHIPPED | OUTPATIENT
Start: 2024-05-15

## 2024-05-15 RX ORDER — ALBUTEROL SULFATE 90 UG/1
2 AEROSOL, METERED RESPIRATORY (INHALATION) EVERY 4 HOURS PRN
Qty: 54 G | Refills: 4 | Status: SHIPPED | OUTPATIENT
Start: 2024-05-15

## 2024-05-15 RX ORDER — MONTELUKAST SODIUM 10 MG/1
1 TABLET ORAL AT BEDTIME
Qty: 90 TABLET | Refills: 4 | Status: SHIPPED | OUTPATIENT
Start: 2024-05-15

## 2024-05-15 ASSESSMENT — PAIN SCALES - GENERAL: PAINLEVEL: NO PAIN (0)

## 2024-05-15 NOTE — PATIENT INSTRUCTIONS
Start taking advair 1 puff twice a day. Take every day for at least 2 weeks to see if it's helpful. If it's not helpful with your breathing you can stop taking it, but if it does seem to help send us a message and we'll change it to 3 months at a time.
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
## 2024-05-15 NOTE — NURSING NOTE
Is the patient currently in the state of [place]? YES    Visit mode:VIDEO    If the visit is dropped, the patient can be reconnected by: VIDEO VISIT: Text to cell phone:   Telephone Information:   Mobile [phone]       Will anyone else be joining the visit? NO  (If patient encounters technical issues they should call [phone])    How would you like to obtain your AVS? MyChart    Are changes needed to the allergy or medication list? Pt stated no changes to allergies and Pt stated no med changes    Are refills needed on medications prescribed by this physician? NO    Reason for visit: RECHECK    Lara VERAF
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
## 2024-05-17 ASSESSMENT — ENCOUNTER SYMPTOMS
FEVER: 0
WHEEZING: 0
HEMOPTYSIS: 0
SHORTNESS OF BREATH: 1
WEIGHT LOSS: 0
COUGH: 1
SPUTUM PRODUCTION: 1
CHILLS: 0

## 2024-05-21 ENCOUNTER — TELEPHONE (OUTPATIENT)
Dept: PULMONOLOGY | Facility: CLINIC | Age: 84
End: 2024-05-21
Payer: COMMERCIAL

## 2024-05-21 NOTE — TELEPHONE ENCOUNTER
Patient Contacted for the patient to call back and schedule the following:    Appointment type: Return Pulm  Provider: Octavio  Return date: 11/15/2024  Specialty phone number: [phone]  Additional appointment(s) needed: na  Additonal Notes: video visit

## 2024-10-05 ENCOUNTER — HEALTH MAINTENANCE LETTER (OUTPATIENT)
Age: 84
End: 2024-10-05

## 2024-10-08 ENCOUNTER — TELEPHONE (OUTPATIENT)
Dept: PULMONOLOGY | Facility: CLINIC | Age: 84
End: 2024-10-08
Payer: COMMERCIAL

## 2024-10-08 NOTE — TELEPHONE ENCOUNTER
----- Message from Aysha LIANG sent at 10/8/2024  8:41 AM CDT -----  Marlene-  Please FAX chest CT order to [facility] in [place] and ask them to call and schedule chest CT and upload to PACS when complete. It needs to be complete before November visit with Dr. Cunningham.  Thanks,  Aysha

## 2024-11-13 ENCOUNTER — VIRTUAL VISIT (OUTPATIENT)
Dept: PULMONOLOGY | Facility: CLINIC | Age: 84
End: 2024-11-13
Attending: INTERNAL MEDICINE
Payer: COMMERCIAL

## 2024-11-13 DIAGNOSIS — J47.1 BRONCHIECTASIS WITH ACUTE EXACERBATION (H): ICD-10-CM

## 2024-11-13 DIAGNOSIS — J47.9 BRONCHIECTASIS WITHOUT COMPLICATION (H): ICD-10-CM

## 2024-11-13 PROCEDURE — 99214 OFFICE O/P EST MOD 30 MIN: CPT | Mod: 95 | Performed by: INTERNAL MEDICINE

## 2024-11-13 RX ORDER — ALBUTEROL SULFATE 90 UG/1
2 INHALANT RESPIRATORY (INHALATION) EVERY 4 HOURS PRN
Qty: 54 G | Refills: 11 | Status: SHIPPED | OUTPATIENT
Start: 2024-11-13

## 2024-11-13 RX ORDER — MONTELUKAST SODIUM 10 MG/1
1 TABLET ORAL AT BEDTIME
Qty: 90 TABLET | Refills: 11 | Status: SHIPPED | OUTPATIENT
Start: 2024-11-13

## 2024-11-13 ASSESSMENT — ENCOUNTER SYMPTOMS
SPUTUM PRODUCTION: 1
SHORTNESS OF BREATH: 1
WHEEZING: 0
COUGH: 1

## 2024-11-13 NOTE — PROGRESS NOTES
"Virtual Visit Details    Type of service:  Video Visit   Video Start Time: {video visit start/end time for provider to select:298585}  Video End Time:{video visit start/end time for provider to select:634261}    Originating Location (pt. Location): {video visit patient location:692534::\"Home\"}  {PROVIDER LOCATION On-site should be selected for visits conducted from your clinic location or adjoining [facility] hospital, academic office, or other nearby [facility] building. Off-site should be selected for all other provider locations, including home:273943}  Distant Location (provider location):  {virtual location provider:863759}  Platform used for Video Visit: {Virtual Visit Platforms:029100::\"[employer]\"}    "

## 2024-11-13 NOTE — NURSING NOTE
Current patient location: [facility]    [facility] [address]    Is the patient currently in the state of [place]? YES    Visit mode:VIDEO    If the visit is dropped, the patient can be reconnected by:VIDEO VISIT: Text to cell phone:   Telephone Information:   Mobile [phone]       Will anyone else be joining the visit? NO  (If patient encounters technical issues they should call [phone])    Are changes needed to the allergy or medication list? Pt stated no changes to allergies and Pt stated no med changes    Are refills needed on medications prescribed by this physician? Discuss with provider    Rooming Documentation:  Questionnaire(s) completed    Reason for visit: DONAL HONEYCUTT

## 2024-11-13 NOTE — LETTER
11/13/2024      Michelle Mobley [address]  [address]  [place] [address]      Dear Colleague,    Thank you for referring your patient, Michelle Crowder, to the [facility]. Please see a copy of my visit note below.      Video-Visit Details    Type of service:  Video Visit   Originating Location (pt. Location): Home    Distant Location (provider location):  On-site  Platform used for Video Visit: Ruthie  Signed Electronically by: Nicolás Cunningham MD  Start Time: 1123am  End Time: 1133  Duration: 10               [facility] [address] [address]  Phone: [phone]  Fax: [phone]          Pulmonology Clinic Follow up Visit       Michelle Crowder MRN# [id number]   YOB: 1940 Age: 84 year old     Date of Visit: 11/13/2024    Reason for Visit: Follow-up bronchiectasis          Assessment and Plan:       ## Bronchiectasis  ## Past Aspergillus culture  ## Positive Pseudomonas culture status post eradication therapy  ## Seasonal allergies  ## Possible asthma    This patient is a never smoker with a lifelong history of frequent bronchitis and pulmonary infections but had no baseline respiratory complaints until May 30 2021 when she developed a pneumonia, following that she had persistent dyspnea.  Etiology of her bronchiectasis is not entirely clear, but she has no recognized exposures, and a negative swallow evaluation for aspiration, has undergone bronchoscopy on 9/17/2021 at [facility] with Aspergillus and Pseudomonas.  Multiple AFBs have been negative.  So it is likely the results of recurrent pneumonia/pulmonary infections early in life. 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  In the past she has grown multiple strains of Pseudomonas. It was unclear whether not this was a colonizer.  She had  significant symptomatic benefit with eradication therapy (2 weeks of cefepime) and return of symptoms approximately 1 month after completion of therapy which might suggest that it is more than just colonizer, however repeat sputum testing has been negative for Pseudomonas.    She was treated with inhaled tobramycin alternating 2 weeks on with 2 weeks off for initial period of 6 months.  Since that time it seems that her exacerbations have been significantly reduced.    Total IgE and ABPA panel was within normal limits on 8/2.  Sputum culture at that time was positive only for Candida (ordered at her last visit but conducted at her local hospital) she was also found to have 2 strains of Pseudomonas.      PFT at [facility] revealed mild obstruction without bronchodilator response and moderate diffusion defect.  However it is unclear if she was on controller med at that time.    She was prescribed clearance therapy with an Aerobika but has not been using it consistently.  I advised her to resume using this now.       -Continue albuterol MDI, use as needed, but specifically prior to use of flutter valve  -Again advised to try advair  -Continue Singulair, Mucinex, and Zyrtec.    -Resume using flutter valve 3-4 times per day as able  -Discussed vest but she doesn't want it  -Additionally, if her symptoms worsen or frequent exacerbations occur she may benefit from bronchoscopy to assess for occult infection       ### JEAN  She follows with sleep medicine in the [facility] system, uses CPAP consistently at night.  -Continue using CPAP         Return to clinic: 6 months      Nicolás Cunningham M.D.  Pulmonary & Critical Care  Pager: Click Here to page          History of Present Illness / Interval History:     Michelle Crowder is a 84 year old female with H/O atrial fibrillation on apixaban, osteoporosis, history of breast cancer status post mastectomy, hypertension, and bronchiectasis who presents for second opinion for  management of her bronchiectasis.     Last seen: 5/15/2024    No exacerbations.     Coughing unchanged, breathing ok. Usually coughs all day, just a few times. Minimally productive.    Hasn't started Advair inhaler that was ordered at last visit    Still taking singulair, mucinex, and zyrtec    Some trouble last week with allergies. Increased dyspnea and some wheezing, but that has resolved.          Review of Systems:     Review of Systems   Respiratory:  Positive for cough, sputum production and shortness of breath. Negative for wheezing.             Physical Examination:     There were no vitals taken for this visit.    Physical Exam  GENERAL: alert and no distress  EYES: Eyes grossly normal to inspection.  No discharge or erythema, or obvious scleral/conjunctival abnormalities.  RESP: No audible wheeze, cough, or visible cyanosis.    SKIN: Visible skin clear. No significant rash, abnormal pigmentation or lesions.  NEURO: Cranial nerves grossly intact.  Mentation and speech appropriate for age.  PSYCH: Appropriate affect, tone, and pace of words          Data:     PFT: 12/4/23 ([facility])      The FVC is within normal limits.  The FEV1 and FEV1/FVC ratio are reduced.  The DLCO is reduced.    IMPRESSION:  Mild obstruction with moderate diffusion defect      CT Chest 11/7/24 [facility]  1.  Multifocal bronchiectasis and fibrotic changes scattered throughout the chest again present with overall improvement. Findings are probably sequela prior infection which could be atypical etiology.       All studies listed here were independently reviewed and interpreted by me today.         Medications:     Current Outpatient Medications   Medication Sig Dispense Refill     albuterol (PROAIR HFA/PROVENTIL HFA/VENTOLIN HFA) 108 (90 Base) MCG/ACT inhaler Inhale 2 puffs into the lungs every 4 hours as needed for shortness of breath or wheezing (As needed and prior to using flutter valve) 54 g 4     amLODIPine (NORVASC) 5 MG tablet Take  5 mg by mouth       apixaban ANTICOAGULANT (ELIQUIS) 2.5 MG tablet Take 2.5 mg by mouth daily       apixaban ANTICOAGULANT (ELIQUIS) 5 MG tablet Take 5 mg by mouth (Patient not taking: Reported on 3/14/2023)       azelastine (ASTELIN) 0.1 % nasal spray Spray 2 sprays in nostril       cetirizine (ZYRTEC) 10 MG tablet Take 10 mg by mouth       flecainide (TAMBOCOR) 50 MG tablet Take 50 mg by mouth       fluticasone-salmeterol (ADVAIR) 100-50 MCG/ACT inhaler Inhale 1 puff into the lungs every 12 hours 60 each 11     losartan (COZAAR) 100 MG tablet Take 1 tablet by mouth daily       MAGNESIUM CITRATE  mg       MAGNESIUM CITRATE PO Take 135 mg by mouth       metoprolol tartrate (LOPRESSOR) 25 MG tablet Take 25 mg by mouth 2 times daily       montelukast (SINGULAIR) 10 MG tablet Take 1 tablet (10 mg) by mouth at bedtime 90 tablet 4     Multiple Vitamins-Minerals (PRESERVISION AREDS 2 PO)        UNABLE TO FIND Votesse       vitamin E (TOCOPHEROL) 400 units (180 mg) capsule Take 400 Units by mouth daily       No current facility-administered medications for this visit.             The above note was dictated using voice recognition software and may include typographical errors. Please contact the author for any clarifications.        Again, thank you for allowing me to participate in the care of your patient.        Sincerely,        Nicolás Cunningham MD

## 2024-11-13 NOTE — PROGRESS NOTES
Video-Visit Details    Type of service:  Video Visit   Originating Location (pt. Location): Home    Distant Location (provider location):  On-site  Platform used for Video Visit: Ruthie  Signed Electronically by: Nicolás Cunningham MD  Start Time: 1123am  End Time: 1133  Duration: 10               [facility] [address] [address]  Phone: [phone]  Fax: [phone]          Pulmonology Clinic Follow up Visit       Michelle Crowder MRN# [id number]   YOB: 1940 Age: 84 year old     Date of Visit: 11/13/2024    Reason for Visit: Follow-up bronchiectasis          Assessment and Plan:       ## Bronchiectasis  ## Past Aspergillus culture  ## Positive Pseudomonas culture status post eradication therapy  ## Seasonal allergies  ## Possible asthma    This patient is a never smoker with a lifelong history of frequent bronchitis and pulmonary infections but had no baseline respiratory complaints until May 30 2021 when she developed a pneumonia, following that she had persistent dyspnea.  Etiology of her bronchiectasis is not entirely clear, but she has no recognized exposures, and a negative swallow evaluation for aspiration, has undergone bronchoscopy on 9/17/2021 at [facility] with Aspergillus and Pseudomonas.  Multiple AFBs have been negative.  So it is likely the results of recurrent pneumonia/pulmonary infections early in life. 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  In the past she has grown multiple strains of Pseudomonas. It was unclear whether not this was a colonizer.  She had significant symptomatic benefit with eradication therapy (2 weeks of cefepime) and return of symptoms approximately 1 month after completion of therapy which might suggest that it is more than just colonizer, however repeat sputum testing has been negative for Pseudomonas.    She was treated with  inhaled tobramycin alternating 2 weeks on with 2 weeks off for initial period of 6 months.  Since that time it seems that her exacerbations have been significantly reduced.    Total IgE and ABPA panel was within normal limits on 8/2.  Sputum culture at that time was positive only for Candida (ordered at her last visit but conducted at her local hospital) she was also found to have 2 strains of Pseudomonas.      PFT at [facility] revealed mild obstruction without bronchodilator response and moderate diffusion defect.  However it is unclear if she was on controller med at that time.    She was prescribed clearance therapy with an Aerobika but has not been using it consistently.  I advised her to resume using this now.       -Continue albuterol MDI, use as needed, but specifically prior to use of flutter valve  -Again advised to try advair  -Continue Singulair, Mucinex, and Zyrtec.    -Resume using flutter valve 3-4 times per day as able  -Discussed vest but she doesn't want it  -Additionally, if her symptoms worsen or frequent exacerbations occur she may benefit from bronchoscopy to assess for occult infection       ### JEAN  She follows with sleep medicine in the [facility] system, uses CPAP consistently at night.  -Continue using CPAP         Return to clinic: 6 months      Nicolás Cunningham M.D.  Pulmonary & Critical Care  Pager: Click Here to page          History of Present Illness / Interval History:     Michelle Crowder is a 84 year old female with H/O atrial fibrillation on apixaban, osteoporosis, history of breast cancer status post mastectomy, hypertension, and bronchiectasis who presents for second opinion for management of her bronchiectasis.     Last seen: 5/15/2024    No exacerbations.     Coughing unchanged, breathing ok. Usually coughs all day, just a few times. Minimally productive.    Hasn't started Advair inhaler that was ordered at last visit    Still taking singulair, mucinex, and zyrtec    Some  trouble last week with allergies. Increased dyspnea and some wheezing, but that has resolved.          Review of Systems:     Review of Systems   Respiratory:  Positive for cough, sputum production and shortness of breath. Negative for wheezing.             Physical Examination:     There were no vitals taken for this visit.    Physical Exam  GENERAL: alert and no distress  EYES: Eyes grossly normal to inspection.  No discharge or erythema, or obvious scleral/conjunctival abnormalities.  RESP: No audible wheeze, cough, or visible cyanosis.    SKIN: Visible skin clear. No significant rash, abnormal pigmentation or lesions.  NEURO: Cranial nerves grossly intact.  Mentation and speech appropriate for age.  PSYCH: Appropriate affect, tone, and pace of words          Data:     PFT: 12/4/23 ([facility])      The FVC is within normal limits.  The FEV1 and FEV1/FVC ratio are reduced.  The DLCO is reduced.    IMPRESSION:  Mild obstruction with moderate diffusion defect      CT Chest 11/7/24 [place]  1.  Multifocal bronchiectasis and fibrotic changes scattered throughout the chest again present with overall improvement. Findings are probably sequela prior infection which could be atypical etiology.       All studies listed here were independently reviewed and interpreted by me today.         Medications:     Current Outpatient Medications   Medication Sig Dispense Refill    albuterol (PROAIR HFA/PROVENTIL HFA/VENTOLIN HFA) 108 (90 Base) MCG/ACT inhaler Inhale 2 puffs into the lungs every 4 hours as needed for shortness of breath or wheezing (As needed and prior to using flutter valve) 54 g 4    amLODIPine (NORVASC) 5 MG tablet Take 5 mg by mouth      apixaban ANTICOAGULANT (ELIQUIS) 2.5 MG tablet Take 2.5 mg by mouth daily      apixaban ANTICOAGULANT (ELIQUIS) 5 MG tablet Take 5 mg by mouth (Patient not taking: Reported on 3/14/2023)      azelastine (ASTELIN) 0.1 % nasal spray Spray 2 sprays in nostril      cetirizine (ZYRTEC)  10 MG tablet Take 10 mg by mouth      flecainide (TAMBOCOR) 50 MG tablet Take 50 mg by mouth      fluticasone-salmeterol (ADVAIR) 100-50 MCG/ACT inhaler Inhale 1 puff into the lungs every 12 hours 60 each 11    losartan (COZAAR) 100 MG tablet Take 1 tablet by mouth daily      MAGNESIUM CITRATE  mg      MAGNESIUM CITRATE PO Take 135 mg by mouth      metoprolol tartrate (LOPRESSOR) 25 MG tablet Take 25 mg by mouth 2 times daily      montelukast (SINGULAIR) 10 MG tablet Take 1 tablet (10 mg) by mouth at bedtime 90 tablet 4    Multiple Vitamins-Minerals (PRESERVISION AREDS 2 PO)       UNABLE TO FIND Votesse      vitamin E (TOCOPHEROL) 400 units (180 mg) capsule Take 400 Units by mouth daily       No current facility-administered medications for this visit.             The above note was dictated using voice recognition software and may include typographical errors. Please contact the author for any clarifications.

## 2024-12-09 ENCOUNTER — TELEPHONE (OUTPATIENT)
Dept: PULMONOLOGY | Facility: CLINIC | Age: 84
End: 2024-12-09
Payer: COMMERCIAL

## 2024-12-09 NOTE — TELEPHONE ENCOUNTER
Patient confirmed scheduled appointment:  Date: 05/14/2025  Time: 1:00PM  Visit type: SHAWANDA  Provider: NIKKIE  Location: VIRTUAL  Testing/imaging: N/A  Additional notes: Patient confirmed will be in [place] for virtual visit

## (undated) RX ORDER — ALBUTEROL SULFATE 0.83 MG/ML
SOLUTION RESPIRATORY (INHALATION)
Status: DISPENSED
Start: 2022-08-02